# Patient Record
Sex: MALE | Race: WHITE | Employment: OTHER | URBAN - METROPOLITAN AREA
[De-identification: names, ages, dates, MRNs, and addresses within clinical notes are randomized per-mention and may not be internally consistent; named-entity substitution may affect disease eponyms.]

---

## 2017-08-22 ENCOUNTER — HOSPITAL ENCOUNTER (OUTPATIENT)
Dept: PHYSICAL THERAPY | Age: 72
Discharge: HOME | End: 2017-08-22
Payer: MEDICARE

## 2017-08-22 PROCEDURE — G8985 CARRY GOAL STATUS: HCPCS

## 2017-08-22 PROCEDURE — G8984 CARRY CURRENT STATUS: HCPCS

## 2017-08-22 PROCEDURE — 97162 PT EVAL MOD COMPLEX 30 MIN: CPT

## 2017-08-22 PROCEDURE — 97110 THERAPEUTIC EXERCISES: CPT

## 2017-08-22 NOTE — PROGRESS NOTES
Andrew Elizabeth 31  [facility] PHYSICAL THERAPY  [address] [place] [address] [address], [address] - Phone: [phone]  Fax: [phone] / 6386 [facility]  Patient Name: Roberth Ochoa :    Medical   Diagnosis: Neck pain [M54.2] Treatment Diagnosis: Neck pain [M54.2]   Onset Date: ~6-7 weeks ago     Referral Source: Sigrid Barton MD [facility]): 2017   Prior Hospitalization: See medical history Provider #: [id number]   Prior Level of Function: Independent with ADLs, ambulation; retired; doing volunteer work, which includes computer work   Comorbidities: Arthritis, hepatitis, kidney stone removal, colonoscopy   Medications: Verified on Patient Summary List   The Plan of Care and following information is based on the information from the initial evaluation.   ===========================================================================================  Assessment / key information:  Patient is a 67 y.o. male who presents with complaints of intermittent neck stiffness, neck pain, R UT pain, R forearm pain and pain/tingling 4th/5th fingers R hand. Patient demonstrates impaired posture with protracted head/shoulders and increased kyphosis, decreased C/S ROM, + Spurling's test on R and decreased position/activity tolerance. Patient able to abolish symptoms with repeated C/S retraction. FOTO (Functional Status) = 68/100, indicating slight functional limitation. Patient would benefit from skilled PT services to address these issues and improve function.   Thank you for this referral.  Active Movements:  ROM AROM PROM Comments:pain, area   Forward flexion 60    NE   Extension 20   NE   SB right 40   NE   SB left  40   NE   Rotation right 60   NE   Rotation left 50   NE      ==========================================================================================  Eval Complexity: History: MEDIUM  Complexity : 1-2 comorbidities / personal factors will impact the outcome/ POC Exam:HIGH Complexity : 4+ Standardized tests and measures addressing body structure, function, activity limitation and / or participation in recreation  Presentation: MEDIUM Complexity : Evolving with changing characteristics  Clinical Decision Making:MEDIUM Complexity : FOTO score of 26-74Overall Complexity:MEDIUM    Problem List: pain affecting function, decrease ROM, decrease strength, decrease ADL/ functional abilitiies, decrease activity tolerance and decrease flexibility/ joint mobility   Treatment Plan may include any combination of the following: Therapeutic exercise, Therapeutic activities, Neuromuscular re-education, Physical agent/modality, Manual therapy, Patient education and Self Care training  Patient / Family readiness to learn indicated by: asking questions, trying to perform skills and interest  Persons(s) to be included in education: patient (P)  Barriers to Learning/Limitations: None  Measures taken:    Patient Goal (s): \"Reduction in pain. \"   Patient self reported health status: good  Rehabilitation Potential: good   Short Term Goals: To be accomplished in  2  weeks:  1. Patient will demonstrate compliance with HEP. 2. Patient will report less than or equal to 4/10 pain at worst to allow increased activity tolerance. 3. Patient will demonstrate ability to independently abolish symptoms to allow increased activity tolerance.  Long Term Goals: To be accomplished in  4  weeks:  1. Patient will demonstrate independence with HEP. 2. Patient will demonstrate 60 degrees L C/S rotation to facilitate ADLs, including driving. 3. Patient will score greater than or equal to 74/100 on FOTO to allow increased activity tolerance.   Frequency / Duration:   Patient to be seen  1-2  times per week for 4  weeks:  Patient / Caregiver education and instruction: self care, activity modification and exercises  G-Codes (GP): Carry [id number] Current  CJ= 20-39%    Goal  CJ= 20-39%. The severity rating is based on the FOTO Score    Therapist Signature: Samara Patricio PT Date: 4/72/1595   Certification Period: 8/22/2017-11/21/2017 Time: 2:52 PM   ===========================================================================================  I certify that the above Physical Therapy Services are being furnished while the patient is under my care. I agree with the treatment plan and certify that this therapy is necessary. Physician Signature:        Date:       Time:     Please sign and return to [employer] or you may fax the signed copy to [phone]. Thank you.

## 2017-08-22 NOTE — PROGRESS NOTES
PHYSICAL THERAPY - DAILY TREATMENT NOTE    Patient Name: Magan Martinez        Date: 2017  : 1945   YES Patient  Verified  Visit #:   1     Insurance: Payor: Anders Robert / Plan: [place] MED[place] PART A & B / Product Type: Medicare /      In time: 1:35 Out time: 2:20   Total Treatment Time: 39     Medicare Time Tracking (below)   Total Timed Codes (min):  10 1:1 Treatment Time:  10     TREATMENT AREA =  C/S    SUBJECTIVE    Pain Level (on 0 to 10 scale):  0  / 10   Medication Changes/New allergies or changes in medical history, any new surgeries or procedures? NO    If yes, update Summary List   Subjective Functional Status/Changes:  []  No changes reported     Pt reports that he was helping friend unload a large, heavy television from back of [employer] truck to curb ~ 6-7 weeks ago and felt sharp pain in R UT region. Pt reports that he had pain R UE extending to 4th/5th fingers of R hand and tingling 4th/5th fingers of R hand. Pt reports that went to PCP and PA checked for RC tear. Pt reports that he was referred to orthopedic surgeon who diagnosed him with pinched nerve C6-C7. Pt reports that he had C/S x-ray, which showed arthritis. Pt reports that he continues to have intermittent pain neck, R UT and R medial forearm and intermittent tingling 4th/5th fingers of R hand. Pt reports that he has been taking Prednisone for past 5 days. Pt wearing pad R elbow secondary to pain. Pt reports that topical pain meds help with pain, but he is unable to identify anything else that relieves pain. OBJECTIVE    Physical Therapy Evaluation Cervical Spine - [facility]    SUBJECTIVE  Chief Complaint:    Mechanism of injury:    Symptoms  Pain rating (0-10):    Today: 0    Best: 0   Worst: 6   [] Constant:    [x] Intermittent: Intermittent neck, R UT and R medial forearm pain, intermittent tingling 4th/5th fingers of R hand     Aggravated by:   [] Bending [] Sitting [] Standing [] Reaching Overhead   [] Moving [] Cough [] Sneeze [] Eating   [] AM  [] PM  Lying:  [] sup   [] pro   [x] sidelying (left)  [x] Other: shaving, sitting at desk and typing on desktop computer (screen straight ahead, looking down slightly, wrist resting on mousepad), watching TV (sitting on wooden chair without armrests)   Eased by:    [] Bending [] Sitting [] Standing Lying: [] sup  [] pro  [x] sidelying (right with R elbow extended)   [x] Moving [] AM  [] PM  [x] Other: topical ibuprofen      General Health:  Red Flags Indicated? [] Yes    [] No  [] Yes [x] No Recent weight change (If yes, due to dieting? [] Yes  [] No)   [] Yes [x] No Persistent cough  [] Yes [x] No Unremitting pain at night  [] Yes [x] No Dizziness  [] Yes [x] No Blurred vision  [] Yes [x] No Hands more cold or painful in cold weather  [] Yes [x] No Ringing in ears  [] Yes [x] No Difficulty swallowing  [] Yes [x] No Dysfunction of bowel or bladder  [] Yes [x] No Recent illness within past 3 weeks (i.e, cold, flu)  [] Yes [x] No Jaw pain    Past History/Treatments: Meds    Diagnostic Tests: [] Lab work [x] X-rays    [] CT [] MRI     [] Other:  Results: + arthritis    Functional Status  Prior level of function: Retired; using computer for volunteer work, personal use; doing yardwork; exercising regularly (riding bike and walking)  Present functional limitations: Pain while using computer  What position do you sleep in?: Right sidelying with R elbow extended    Headaches: Do you have headaches? [] Yes   [x] No  How often do you get headaches? How long does the headache last?  What aggravates it? What relieves it? Does the headache coincide with any other symptoms (visual disturbances, light sensitivity)? Where is the headache? Does it change locations?   Other:    OBJECTIVE  Posture: [] WNL  Head Position: Protracted  Shoulder/Scapular Position: Protracted  C-Kyphosis:  [x] increased   [] decreased   C-Lordosis:   [] increased   [x] decreased  T-Kyphosis:  [x] increased   [] decreased  T-Lordosis:   [] increased   [] decreased     TMJ: [x] N/A [] Abnormal - ROM:   Palpation:    Cervical Retraction: [] WNL    [x] Abnormal: Decreased motion    Shoulder/Scapular Screen: [x] WNL    [] Abnormal:    Active Movements: [] N/A   [] Too acute   [] Other:  ROM % AROM % PROM Comments:pain, area   Forward flexion 60  NE   Extension 21  NE   SB right 40  NE   SB left  40  NE   Rotation right 60  NE   Rotation left 50  NE     Thoracic Spine: [] N/A    [] WNL   [x] Other: Decreased motion    PROM: NT    Palpation: - No tenderness to palpation  [] Min  [] Mod  [] Severe    Location:  [] Min  [] Mod  [] Severe    Location:  [] Min  [] Mod  [] Severe    Location:    Neuro Screen (myotome/dematome/felexes): [x] WNL  Myotome Level Muscle Test Myotome Level Muscle Test   C5 Shoulder Adduction - Deltoid C8 Finger Flexors   C6 Wrist Extension T1 Finger Abduction - Interossei   C7 Elbow Extension     Comments: Intermittent tingling lateral 2 fingers R hand    Upper Limb Tension Tests: [] N/A       Ulnar: [] R    [] L    [] +    [] -       Median: [] R    [] L    [] +    [] -       Radial: [] R    [] L    [] +    [] -    Special Tests:  Cervical:        Vertebral Artery:  [] R    [] L    [] +    [] -       Alar Ligament: [] R    [] L    [] +    [] -       Transverse Lig: [] R    [] L    [] +    [] -       Spurling's:  [x] R    [] L    [x] +    [] -       Distraction:  [] R    [] L    [] +    [] -       Compression: [] R    [] L    [] +    [] -    Thoracic Outlet Tests: [] N/A       Adson's:  [] R    [] L    [] +    [] -       Hyperabduction: [] R    [] L    [] +    [] -       Damian's:  [] R    [] L    [] +    [] -       Paty:  [] R    [] L    [] +    [] -    Diaphragmatic Breathing: [] Normal    [] Abnormal    Muscle Flexibility: [] N/A   Scalenes: [] WNL    [x] Tight    [x] R    [x] L   Upper Trap: [] WNL    [x] Tight    [x] R    [x] L   Levator: [] WNL    [x] Tight [x] R    [x] L   Pect. Minor: [] WNL    [x] Tight    [x] R    [x] L    Global Muscular Weakness: [] N/A   Lower Trap:   Rhomboids:   Middle Trap:   Serratus Ant:   Ext Rotators:    Other:    Other tests/comments:    10 min Therapeutic Exercise:  [x]  See flow sheet   Rationale:      increase ROM and decrease symptoms to improve the patients ability to perform ADLs/IADLs, functional mobility and gait safely and independently without increased pain/symptoms     During TE min Patient Education:  YES  Reviewed HEP   [x]  Progressed/Changed HEP based on:   Initiated HEP; discussed centralization/peripheralization, avoidance of activities that produce/increase/peripheralize symptoms, use of repeated C/S retraction to centralize/decrease/abolish symptoms and importance of posture/ergonomics     Other Objective/Functional Measures:    Initiated C/S retraction (seated and supine) - able to abolish symptoms with repeated C/S retraction     Post Treatment Pain Level (on 0 to 10) scale:   0  / 10     ASSESSMENT    Assessment/Changes in Function:     See plan of care  Justification for Eval Code Complexity:  Patient History : MEDIUM - arthritis  Examination HIGH - See objective  Clinical Presentation: MEDIUM  Clinical Decision Making : MEDIUM - FOTO 68/100     []  See Progress Note/Recertification   Patient will continue to benefit from skilled PT services: see plan of care   Progress toward goals / Updated goals:    See plan of care       PLAN    [x]  Upgrade activities as tolerated YES Continue plan of care   []  Discharge due to :    []  Other:      Therapist: Janelle Stephenson, PT    Date: 8/22/2017 Time: 1:38 PM

## 2017-08-23 ENCOUNTER — HOSPITAL ENCOUNTER (OUTPATIENT)
Dept: PHYSICAL THERAPY | Age: 72
Discharge: HOME | End: 2017-08-23
Payer: MEDICARE

## 2017-08-23 ENCOUNTER — APPOINTMENT (OUTPATIENT)
Dept: PHYSICAL THERAPY | Age: 72
End: 2017-08-23
Payer: MEDICARE

## 2017-08-23 PROCEDURE — 97110 THERAPEUTIC EXERCISES: CPT

## 2017-08-23 NOTE — PROGRESS NOTES
PHYSICAL THERAPY - DAILY TREATMENT NOTE    Patient Name: Tamiko Alejandre        Date: 2017  : 1945   YES Patient  Verified  Visit #:   2   of     Insurance: Payor: Venancio Matthews / Plan: [place] MEDICARE PART A & B / Product Type: Medicare /      In time: 3:00 Out time: 3:40   Total Treatment Time: 40     Medicare Time Tracking (below)   Total Timed Codes (min):  40 1:1 Treatment Time:  30     TREATMENT AREA = Neck pain [M54.2]    SUBJECTIVE    Pain Level (on 0 to 10 scale):  2C  / 10   Medication Changes/New allergies or changes in medical history, any new surgeries or procedures? NO    If yes, update Summary List   Subjective Functional Status/Changes:  []  No changes reported     \"It's doing pretty good. It's all in the neck, been doing my exercises.  \"          OBJECTIVE    30(30) min Therapeutic Exercise:  [x]  See flow sheet   Rationale:      increase ROM, increase strength and facilitate proper motor control to improve the patients ADL tolerance and return to function      Modalities Rationale: Prophylaxis/Palliative        min [] Estim, type/location:                                      []  att     []  unatt     []  w/US     []  w/ice    []  w/heat    min []  Mechanical Traction: type/lbs                   []  pro   []  sup   []  int   []  cont    []  before manual    []  after manual    min []  Ultrasound, settings/location:      min []  Iontophoresis w/ dexamethasone, location:                                               []  take home patch       []  in clinic   10 min []  Ice     []  Heat    Position/location: Supine, C/S    min []  Vasopneumatic Device, press/temp:     min []  Other:    [] Skin assessment post-treatment (if applicable):    []  intact    []  redness- no adverse reaction     []redness  adverse reaction:      Throughout Rx min Patient Education:  YES  Reviewed HEP   []  Progressed/Changed HEP based on:   Display of proper form in clinic  Improvement in condition and current complaints     Other Objective/Functional Measures:    Performed repeated retractions throughout treatment b/w sets/reps/exercises as needed as prophylaxis and symptoms management. peripheralized to R fingertips with t-band retractions, but abolished with repeated retractions       Post Treatment Pain Level (on 0 to 10) scale:   0  / 10     ASSESSMENT    Assessment/Changes in Function:     Centralization confirms mechanical derangement   []  See Progress Note/Recertification   Patient will continue to benefit from skilled PT services to  modify and progress therapeutic interventions, address functional mobility deficits, address ROM deficits, address strength deficits, analyze and address soft tissue restrictions, analyze and cue movement patterns, analyze and modify body mechanics/ergonomics and instruct in home and community integration to attain remaining goals. Progress toward goals / Updated goals:    1st session since initial eval, no significant progress noted. PLAN     []  Upgrade activities as tolerated YES Continue plan of care   []  Discharge due to :    []  Other: Update HEP to incorporate stabilization therex if he is able to maintain centralized state. Therapist: Amelie Camacho \"BJ\" Waldo Irwin, DPT, Cert. MDT, Cert. DN, Cert.  SMT    Date: 8/23/2017 Time: 3:06 PM   Future Appointments  Date Time Provider Jomar Henson   8/28/2017 1:00 PM [address]   8/30/2017 1:00 PM [facility] 1 [facility]   9/5/2017 1:00 PM [address]   9/7/2017 10:30 AM [facility] 1 [facility]   9/12/2017 9:00 AM [facility] 1 [facility]   9/19/2017 1:00 PM Haydee Mendez PT [facility]   9/21/2017 11:30 AM Haydee Mendez PT [facility]   9/26/2017 1:00 PM [facility] 1 [facility]   9/28/2017 1:00 PM Haydee Mendez PT [facility]   3/26/2018 8:30 AM [facility] RIKY Azevedo [address]

## 2017-08-28 ENCOUNTER — HOSPITAL ENCOUNTER (OUTPATIENT)
Dept: PHYSICAL THERAPY | Age: 72
Discharge: HOME | End: 2017-08-28
Payer: MEDICARE

## 2017-08-28 PROCEDURE — 97110 THERAPEUTIC EXERCISES: CPT

## 2017-08-28 PROCEDURE — 97140 MANUAL THERAPY 1/> REGIONS: CPT

## 2017-08-28 NOTE — PROGRESS NOTES
PHYSICAL THERAPY - DAILY TREATMENT NOTE    Patient Name: Mitra Epperson        Date: 2017  : 1945   YES Patient  Verified  Visit #:   3   of    Insurance: Payor: Alison Araujo / Plan: [place] MEDICARE PART A & B / Product Type: Medicare /      In time: 105 Out time: 145   Total Treatment Time: 40     Medicare Time Tracking (below)   Total Timed Codes (min):  40 1:1 Treatment Time:  40     TREATMENT AREA = neck    SUBJECTIVE    Pain Level (on 0 to 10 scale):  4 / 10   Medication Changes/New allergies or changes in medical history, any new surgeries or procedures? NO    If yes, update Summary List   Subjective Functional Status/Changes:  []  No changes reported     I was on the computer for a couple of hours yesterday and I think it flared me up in my right arm and shoulder blade. OBJECTIVE      30 min Therapeutic Exercise:  [x]  See flow sheet   Rationale:      increase ROM, increase strength, increase proprioception and improve upper thoracic and cervical posture to improve the patients ability to  perform ADL/iADLs functional mobility and gait safely and independently without increased symptoms. min Therapeutic Activity:    Rationale:      min Neuromuscular Re-ed:    Rationale:     13 min Manual Therapy: Supine manual traction with progression into cevical retraction ROM. Artist Avelina 2x10 reps. SCS to lower right lev scap tender point   Rationale:      decrease pain, increase ROM, increase tissue extensibility and decrease trigger points to improve patient's ability to maintain good posture for ADLs driving and independence with IADLS while not increasing symptoms          Rationale: While doing retractions min Patient Education:  [place] Sensor   []  Progressed/Changed HEP based on: Added standing retractions against wall.  Education and hand out for seated posture at computer     Other Objective/Functional Measures  Pt has limited AROM into cervical retraction in both sitting and supine. Pt does not tolerate wall push up or slide without increasing R UE symptoms. Post Treatment Pain Level (on 0 to 10) scale:   2 / 10     ASSESSMENT    Assessment/Changes in Function:  Pt tolerates PT for posture education and progression of cervical retraction ROM. Pt symptoms remain easily aggravated with thoracic extension and stabilization exs. Pt does not have full AROM of cervical retraction as of yet and keeps head in right lateral flexion in sitting and supine. Pt s seated posture at home computer remains aggravating to derangement symptoms         []  See Progress Note/Recertification   Patient will continue to benefit from skilled PT services to analyze,, cue,, progress,, modify,, demonstrate,, instruct, and address, movement patterns,, therapeutic interventions,, postural abnormalities,, soft tissue restrictions,, ROM,, strength,, functional mobility,, body mechanics/ergonomics, and home and community integration, to attain remaining goals. Progress toward goals / Updated goals:  1. Patient will demonstrate compliance with HEP. Radha Eves added to standing at wall  2. Patient will report less than or equal to 4/10 pain at worst to allow increased activity tolerance. ..intermittantly  3. Patient will demonstrate ability to independently abolish symptoms to allow increased activity tolerance. ..progressing         PLAN    [x]  Upgrade activities as tolerated YES Continue plan of care   []  Discharge due to :    []  Other:      Therapist: Nic Head PT    Date: 8/28/2017 Time: 12:46 PM   Future Appointments  Date Time Provider Jomar Henson   8/28/2017 1:00 PM [address]   8/30/2017 1:00 PM [address]   9/5/2017 1:00 PM [facility] PT [address]   9/7/2017 10:30 AM [facility] PT [address]   9/12/2017 9:00 AM [facility] PT [address]   9/19/2017 1:00 PM Riley Burgess PT [facility]   9/21/2017 11:30 AM Riley Burgess PT [facility] 9/26/2017 1:00 PM [facility] 1 [facility]   9/28/2017 1:00 PM Cindy G[facility]mez [facility]   3/26/2018 8:30 AM [facility] Joe De Anda

## 2017-08-30 ENCOUNTER — HOSPITAL ENCOUNTER (OUTPATIENT)
Dept: PHYSICAL THERAPY | Age: 72
Discharge: HOME | End: 2017-08-30
Payer: MEDICARE

## 2017-08-30 PROCEDURE — 97110 THERAPEUTIC EXERCISES: CPT

## 2017-08-30 NOTE — PROGRESS NOTES
PHYSICAL THERAPY - DAILY TREATMENT NOTE    Patient Name: Gloria Soto        Date: 2017  : 1945   YES Patient  Verified  Visit #:   4     Insurance: Payor: Judy Das / Plan: [place] MEDICARE PART A & B / Product Type: Medicare /      In time: 100 Out time: 150   Total Treatment Time: 50     Medicare Time Tracking (below)   Total Timed Codes (min):  40 1:1 Treatment Time:  40     TREATMENT AREA =  Neck     SUBJECTIVE    Pain Level (on 0 to 10 scale):  2  / 10   Medication Changes/New allergies or changes in medical history, any new surgeries or procedures? NO    If yes, update Summary List   Subjective Functional Status/Changes:  []  No changes reported     I felt really good last night but then this morning it is around the right shoulder blade          OBJECTIVE    Modalities Rationale: To reduce lower neck pain and muscle guarding after    min [] Estim, type/location:                                      []  att     []  unatt     []  w/US     []  w/ice    []  w/heat    min []  Mechanical Traction: type/lbs                   []  pro   []  sup   []  int   []  cont    []  before manual    []  after manual    min []  Ultrasound, settings/location:      min []  Iontophoresis w/ dexamethasone, location:                                               []  take home patch       []  in clinic   10 min [x]  Ice     []  Heat    location/position: Supine with cervical    min []  Vasopneumatic Device, press/temp:     min []  Other:    [] Skin assessment post-treatment (if applicable):    []  intact    []  redness- no adverse reaction     []redness  adverse reaction:      40 min Therapeutic Exercise:  [x]  See flow sheet   Rationale:      increase ROM and increase strength to improve the patients ability to  perform ADL/iADLs functional mobility and gait safely and independently without increased symptoms.      min Therapeutic Activity:    Rationale:      min Neuromuscular Re-ed:    Rationale: during there exs min Manual Therapy: Anterior cervical flexor deep tissue massage 3 mins while doing deep breathing exs included in there exs time. Rationale:      decrease pain, increase ROM and increase tissue extensibility to improve patient's ability to improve cervical alignment and posture for ADL s while not creating pressure into derangement and causing symptoms     min Gait Training:    Rationale:      During the exs min Patient Education:  YES  Reviewed HEP   []  Progressed/Changed HEP based on: Added to HEP     Other Objective/Functional Measures:  Pt has limited AROM into cervical retraction and thoracic extension. Kamari Stringer added pec stretching single arm in doorway. Added support wall lats stretch at wall to HEP. Kamari Stringer added supine scap stabs     Post Treatment Pain Level (on 0 to 10) scale:   0 / 10     ASSESSMENT    Assessment/Changes in Function:     Pt tolerated therapy but had to adjust angle for doorway and stretch at wall for lats. Pt s radicular symptoms remain moderately irritable . Pt is trying to improve computer ergonomics to assist with symptom control and body posture     []  See Progress Note/Recertification   Patient will continue to benefit from skilled PT services to analyze,, cue,, progress,, modify,, demonstrate,, instruct, and address, movement patterns,, therapeutic interventions,, postural abnormalities,, soft tissue restrictions,, ROM,, strength,, functional mobility, and body mechanics/ergonomics, to attain remaining goals. Progress toward goals / Updated goals:    1Patient will demonstrate compliance with HEP. ..progressing. 2. Patient will report less than or equal to 4/10 pain at worst to allow increased activity tolerance. ..progressing  3.  Patient will demonstrate ability to independently abolish symptoms to allow increased activity tolerance/ improved but needs further ROM     PLAN    []  Upgrade activities as tolerated YES Continue plan of care   []  Discharge due to :    [] Other:      Therapist: Paulino Lopez PT    Date: 8/30/2017 Time: 1:00 PM   Future Appointments  Date Time Provider Jomar Tomlini   9/5/2017 1:00 PM [address]   9/7/2017 10:30 AM [facility] PT [address]   9/12/2017 9:00 AM [facility] PT [address]   9/19/2017 1:00 PM Jodeane Apley, PT [facility]   9/21/2017 11:30 AM Jodeane Apley, PT [facility]   9/26/2017 1:00 PM [facility] PT [address]   9/28/2017 1:00 PM Jodeane Apley, PT [facility]   3/26/2018 8:30 AM [facility] RIKY Azevedo 1574

## 2017-09-05 ENCOUNTER — HOSPITAL ENCOUNTER (OUTPATIENT)
Dept: PHYSICAL THERAPY | Age: 72
Discharge: HOME | End: 2017-09-05
Payer: MEDICARE

## 2017-09-05 PROCEDURE — 97110 THERAPEUTIC EXERCISES: CPT

## 2017-09-05 PROCEDURE — 97012 MECHANICAL TRACTION THERAPY: CPT
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
## 2017-09-07 ENCOUNTER — HOSPITAL ENCOUNTER (OUTPATIENT)
Dept: PHYSICAL THERAPY | Age: 72
Discharge: HOME | End: 2017-09-07
Payer: MEDICARE

## 2017-09-07 PROCEDURE — 97110 THERAPEUTIC EXERCISES: CPT

## 2017-09-07 NOTE — PROGRESS NOTES
PHYSICAL THERAPY - DAILY TREATMENT NOTE    Patient Name: Ambrose Hanley        Date: 2017  : 1945   YES Patient  Verified  Visit #:   6   of  8  Insurance: Payor: Danielle Bradley / Plan: [place] MEDICARE PART A & B / Product Type: Medicare /      In time:  Out time:    Total Treatment Time: 62     Medicare Time Tracking (below)   Total Timed Codes (min):  48 1:1 Treatment Time:  48     TREATMENT AREA =  Cervical and right radicular    SUBJECTIVE    Pain Level (on 0 to 10 scale):  2  / 10   Medication Changes/New allergies or changes in medical history, any new surgeries or procedures? NO    If yes, update Summary List   Subjective Functional Status/Changes:  []  No changes reported     I am improving but still have the occasional sx when least expected          OBJECTIVE    Modalities Rationale:  To promote pain reduction and reduce muscle guarding to allow in improved tolerance for ADLs    min [] Estim, type/location:                                      []  att     []  unatt     []  w/US     []  w/ice    []  w/heat    min []  Mechanical Traction: type/lbs                   []  pro   []  sup   []  int   []  cont    []  before manual    []  after manual    min []  Ultrasound, settings/location:      min []  Iontophoresis w/ dexamethasone, location:                                               []  take home patch       []  in clinic   10 Min [x]  Ice     []  Heat    Location/position: Cervical and right scap in supine    min []  Vasopneumatic Device, press/temp:     min []  Other:    [] Skin assessment post-treatment (if applicable):    [x]  intact    []  redness- no adverse reaction     []redness  adverse reaction:      48 min Therapeutic Exercise:  [x]  See flow sheet   Rationale:      increase ROM, increase strength and improve coordination to improve the patients ability to  perform ADL/iADLs functional mobility and gait safely and independently without increased symptoms           During there exs min Patient Education:  YES  Reviewed HEP   []  Progressed/Changed HEP based on:   Reviewed total     Other Objective/Functional Measures: Total program cervical retractions, doorway single stretch, prone scap stab, standing lower traps lift offs, scap stabs with theraband  Added cervical rotation exs and lower trap lifts offs in standing to HEP     Post Treatment Pain Level (on 0 to 10) scale:   0 / 10     ASSESSMENT    Assessment/Changes in Function:   Pt tolerates new therapy interventions/ cueing for elbow extension with lift offs and sternal lift before cervical rotation exs     []  See Progress Note/Recertification   Patient will continue to benefit from skilled PT services to analyze,, cue,, progress,, modify,, demonstrate,, instruct, and address, movement patterns,, therapeutic interventions,, postural abnormalities,, soft tissue restrictions,, ROM,, strength,, body mechanics/ergonomics, and home and community integration, to attain remaining goals. Progress toward goals / Updated goals:  1. Patient will demonstrate compliance with HEP/ progressing  2. Patient will report less than or equal to 4/10 pain at worst to allow increased activity tolerance. ..intermittantly meeting  3. Patient will demonstrate ability to independently abolish symptoms to allow increased activity tolerance. ..progressing     PLAN    []  Upgrade activities as tolerated YES Continue plan of care   []  Discharge due to :    []  Other:      Therapist: Shante Dorsey PT    Date: 9/7/2017 Time: 10:32 AM   Future Appointments  Date Time Provider Jomar Henson   9/12/2017 9:00 AM [facility]   9/19/2017 1:00 PM Babak Ribeiro PT [facility]   9/21/2017 11:30 AM Babak Ribeiro PT [facility]   9/26/2017 1:00 PM [facility] 1 [facility]   9/28/2017 1:00 PM Babak Ribeiro PT [facility]   3/26/2018 8:30 AM Ella De Anda

## 2017-09-12 ENCOUNTER — HOSPITAL ENCOUNTER (OUTPATIENT)
Dept: PHYSICAL THERAPY | Age: 72
Discharge: HOME | End: 2017-09-12
Payer: MEDICARE

## 2017-09-12 PROCEDURE — G8985 CARRY GOAL STATUS: HCPCS

## 2017-09-12 PROCEDURE — G8986 CARRY D/C STATUS: HCPCS

## 2017-09-12 PROCEDURE — 97110 THERAPEUTIC EXERCISES: CPT

## 2017-09-12 PROCEDURE — 97530 THERAPEUTIC ACTIVITIES: CPT

## 2017-09-12 NOTE — PROGRESS NOTES
PHYSICAL THERAPY - DAILY TREATMENT NOTE    Patient Name: Jessica Clark        Date: 2017  : 1945   YES Patient  Verified  Visit #:   7  of   8  Insurance: Payor: Rodriguez Marquis / Plan: [place] MEDICARE PART A & B / Product Type: Medicare /      In time: 855 Out time: 955   Total Treatment Time: 60     Medicare Time Tracking (below)   Total Timed Codes (min):  50 1:1 Treatment Time:  50     TREATMENT AREA =  Neck and R UE referral    SUBJECTIVE    Pain Level (on 0 to 10 scale):  3 / 10   Medication Changes/New allergies or changes in medical history, any new surgeries or procedures? NO    If yes, update Summary List   Subjective Functional Status/Changes:  []  No changes reported   Flared up on Saturday and  in neck and right arm to elbow. I think I overdid it. It has settled down again and   My posture and flexible is better but I am still struggling with sitting at the  computer       OBJECTIVE        42 min Therapeutic Exercise:  [x]  See flow sheet   Rationale:      increase ROM, increase strength and improve coordination to improve the patients ability to  perform ADL/iADLs functional mobility and gait safely and independently without increased symptoms          8 min Therapeutic Activity: Postural education for use of computer and ipad with optimal head and neck alignment. Eduation in neutral head posture in conversation and avoiding  Demo 11# traction for home unit with outline on time for HEP. Performed 3 mins here with deep breathing exs   Rationale:    perform ADL/iADLs functional mobility and neutral sitting posture independently without increased symptoms. During there exs min Patient Education:  YES  Reviewed HEP   []  Progressed/Changed HEP based on:    Add home cervical traction: after 2 days add supine PA with thoracic and tennis balls plus bilat arm elevations     Other Objective/Functional Measures:    Middle traps gr 3-/5  Lower traps gr 3-/5     Post Treatment Pain Level (on 0 to 10) scale:   0 / 10     ASSESSMENT    Assessment/Changes in Function:   Cervical rotation 45 right and left 35  Over fires right upper trap with std lift offs and less lower traps . Responds to VCs  Tolerates supine PA with tennis balls at mid and upper thoracic spine     [x]  See Progress Note/Recertification   Patient will continue to benefit from skilled PT services to analyze,, cue,, progress,, modify,, demonstrate,, instruct, and address, movement patterns,, therapeutic interventions,, postural abnormalities,, soft tissue restrictions,, ROM,, strength,, functional mobility,, body mechanics/ergonomics, and home and community integration, to attain remaining goals.    Progress toward goals / Updated goals:  See PN       PLAN    []  Upgrade activities as tolerated YES Continue plan of care   []  Discharge due to :    [x]  Other: Pt to bring on side profile pics of posture at computer     Therapist: Florentin Francis PT    Date: 9/12/2017 Time: 8:49 AM   Future Appointments  Date Time Provider Jomar Henson   9/12/2017 9:00 AM [facility] 1 [facility]   9/19/2017 1:00 PM Nohemi Connell PT [facility]   9/21/2017 11:30 AM Nohemi Connell PT [facility]   9/26/2017 1:00 PM [facility] PT [address] 1 [facility]   9/28/2017 1:00 PM Nohemi Connell PT [facility]   3/26/2018 8:30 AM Ella De Anda

## 2017-09-12 NOTE — PROGRESS NOTES
Andrew [address]in[address]  [facility] PHYSICAL THERAPY  [address] [place] [address] [place], [address] - Phone: [phone]  Fax: [phone] [place] [address] [address] [facility]          Patient Name: Holly Buerger : [id number]   Treatment/Medical Diagnosis: Neck pain [M54.2]   Onset Date: 2017    Referral Source: Josefa Reno MD [facility]): 17   Prior Hospitalization: See Medical History Provider #: [id number]   Prior Level of Function: Independent with ADLs, ambulation; retired; doing volunteer work, which includes computer work   Comorbidities:  Arthritis, hepatitis, kidney stone removal, colonoscopy      Medications: Verified on Patient Summary List   Visits from [facility]: 6 Missed Visits: 0     Goal/Measure of Progress Goal Met? 1. Patient will demonstrate independence with HEP   Status at last Eval: NA Current Status: progressing no              2.  Patient will demonstrate 60 degrees L C/S rotation to facilitate ADLs, including driving. Status at last Eval: 60/50 Current Status: 45/35 no   3. Patient will score greater than or equal to 74/100 on FOTO to allow increased activity tolerance.    Status at last Eval: 68 Current Status: 74 yes     Key Functional Changes/Progress: pt has made functional improvements with FOTO score and postural awareness and endurance  Problem List: pain affecting function, decrease ROM, decrease strength, edema affecting function, decrease ADL/ functional abilitiies, decrease activity tolerance and decrease flexibility/ joint mobility   Treatment Plan may include any combination of the following: Therapeutic exercise, Therapeutic activities, Neuromuscular re-education, Physical agent/modality, Manual therapy, Aquatic therapy, Patient education, Self Care training, Functional mobility training and Home safety training  Patient Goal(s) has been updated and includes:  Reduction in pain while sitting at computer  Goals for this certification period include and are to be achieved in   3  weeks:  1. Lower traps gr. 4/5 for UE support while sitting and using computer mouse  2. Cervical Rotation right and left 55 deg for shoulder checking while driving. 3. Pt will demonstrate independence with HEP    Frequency / Duration:   Patient to be seen   2  times per week for   3    weeks:  G-Codes (GP): Carry  [id number] Goal  CJ= 20-39%   D/C  CJ= 20-39%. The severity rating is based on the FOTO Score  Assessments/Recommendations: Pt will benefit from cont skilled PT to progress impairments related to cervical and posture dysfunction to improve daily function. If you have any questions/comments please contact us directly at [phone]. Thank you for allowing us to assist in the care of your patient. Therapist Signature: Pura Mann PT Date: 2/86/8859   Certification Period:  Reporting Period: 8.22.17-11/22/17 Time: 9:36 AM   NOTE TO PHYSICIAN:  PLEASE COMPLETE THE ORDERS BELOW AND FAX TO   [facility] Physical Therapy: [phone]. If you are unable to process this request in 24 hours please contact our office: [phone].    ___ I have read the above report and request that my patient continue as recommended.   ___ I have read the above report and request that my patient continue therapy with the following changes/special instructions: ________________________________________________   ___ I have read the above report and request that my patient be discharged from therapy.      Physician Signature:        Date:       Time:

## 2017-09-19 ENCOUNTER — HOSPITAL ENCOUNTER (OUTPATIENT)
Dept: PHYSICAL THERAPY | Age: 72
Discharge: HOME | End: 2017-09-19
Payer: MEDICARE

## 2017-09-19 PROCEDURE — G8981 BODY POS CURRENT STATUS: HCPCS

## 2017-09-19 PROCEDURE — 97110 THERAPEUTIC EXERCISES: CPT

## 2017-09-19 PROCEDURE — 97140 MANUAL THERAPY 1/> REGIONS: CPT

## 2017-09-19 PROCEDURE — G8984 CARRY CURRENT STATUS: HCPCS

## 2017-09-19 PROCEDURE — G8985 CARRY GOAL STATUS: HCPCS

## 2017-09-19 NOTE — PROGRESS NOTES
PHYSICAL THERAPY - DAILY TREATMENT NOTE    Patient Name: Mia Kim        Date: 2017  : 1945   YES Patient  Verified  Visit #:     Insurance: Payor: Tung Foote / Plan: VA MEDICARE PART A & B / Product Type: Medicare /      In time: 1:00 Out time: 1:50   Total Treatment Time: 50     Medicare Time Tracking (below)   Total Timed Codes (min):  50 1:1 Treatment Time:  40     TREATMENT AREA = Neck pain [M54.2]    SUBJECTIVE    Pain Level (on 0 to 10 scale):  3  / 10   Medication Changes/New allergies or changes in medical history, any new surgeries or procedures? NO    If yes, update Summary List   Subjective Functional Status/Changes:  []  No changes reported     \"It's not going as quickly as I would have liked. The symptoms can go away but they come back . Doing the bands on the floor and tucking my chin and turning seems to do better, but it's frustrating when it comes back.  \"          OBJECTIVE    30 min Therapeutic Exercise:  [x]  See flow sheet   Rationale:      increase ROM, increase strength and facilitate proper motor control to improve the patients ADL tolerance and return to function     10 min Manual Therapy: Seated retraction mobilization   Supine repeated retraction mobilization   Rationale:      decrease pain, increase ROM, increase tissue extensibility, decrease trigger points and facilitate motor control to improve patient's ADL tolerance and return to function     Modalities Rationale: Prophylaxis/Palliative        min [] Estim, type/location:                                      []  att     []  unatt     []  w/US     []  w/ice    []  w/heat    min []  Mechanical Traction: type/lbs                   []  pro   []  sup   []  int   []  cont    []  before manual    []  after manual    min []  Ultrasound, settings/location:      min []  Iontophoresis w/ dexamethasone, location:                                               []  take home patch       []  in clinic 10 min [x]  Ice     []  Heat    Position/location: Supine with wedge under LE  C/S    min []  Vasopneumatic Device, press/temp:     min []  Other:    [] Skin assessment post-treatment (if applicable):    []  intact    []  redness- no adverse reaction     []redness  adverse reaction:      Throughout Rx min Patient Education:  YES  Reviewed HEP   []  Progressed/Changed HEP based on:   Display of proper form in clinic  Improvement in condition and current complaints     Other Objective/Functional Measures:  Assessed for progression of force with retraction, able to abolish with inclusion of extension. Performed repeated retraction with extension throughout treatment b/w sets/reps/exercises as needed as prophylaxis and symptoms management. Post Treatment Pain Level (on 0 to 10) scale:   0  / 10     ASSESSMENT    Assessment/Changes in Function:     Able to independently centralize and abolish after utlizing progression of forces with extension     []  See Progress Note/Recertification   Patient will continue to benefit from skilled PT services to  modify and progress therapeutic interventions, address functional mobility deficits, address ROM deficits, address strength deficits, analyze and address soft tissue restrictions, analyze and cue movement patterns, analyze and modify body mechanics/ergonomics and instruct in home and community integration to attain remaining goals. Progress toward goals / Updated goals:    Updated gcode goals based on lower trap strength/stability. PLAN     []  Upgrade activities as tolerated YES Continue plan of care   []  Discharge due to :    []  Other:      Therapist: Maggi Huitron \"BJ\" MARY Weiner, Cert. MDT, Cert. DN, Cert.  SMT    Date: 9/19/2017 Time: 1:05 PM   Future Appointments  Date Time Provider Jomar Henson   9/21/2017 11:30 AM Sara [facility]   9/26/2017 1:00 PM Vaughn Caldwell [facility]   9/28/2017 1:00 PM Sara Frank [address] [place] [address]   3/26/2018 8:30 [facility]   3/26/2018 8:45 AM Alisha Salter MD [address]

## 2017-09-21 ENCOUNTER — HOSPITAL ENCOUNTER (OUTPATIENT)
Dept: PHYSICAL THERAPY | Age: 72
Discharge: HOME | End: 2017-09-21
Payer: MEDICARE

## 2017-09-21 PROCEDURE — 97110 THERAPEUTIC EXERCISES: CPT

## 2017-09-21 PROCEDURE — 97140 MANUAL THERAPY 1/> REGIONS: CPT

## 2017-09-21 NOTE — PROGRESS NOTES
PHYSICAL THERAPY - DA1:05ILY TREATMENT NOTE    Patient Name: Clara Georges        Date: 2017  : 1945   YES Patient  Verified  Visit #:   9      8+  Insurance: Payor: Michael Sarmiento / Plan: [place] MEDICARE PART A & B / Product Type: Medicare /      In time: 1:05 Out time: 1:45   Total Treatment Time: 40     Medicare Time Tracking (below)   Total Timed Codes (min):  40 1:1 Treatment Time:  40     TREATMENT AREA =  Neck pain [M54.2]    SUBJECTIVE    Pain Level (on 0 to 10 scale):  1-2  / 10   Medication Changes/New allergies or changes in medical history, any new surgeries or procedures? NO     If yes, update Summary List   Subjective Functional Status/Changes:  []  No changes reported     \"My tingling is lessening but is yet to completely go away. \"          OBJECTIVE    30 min Therapeutic Exercise:  [x]  See flow sheet   Rationale:      increase ROM and increase strength to improve the patients ability to perform ADL's with improved periscapular stability. 10 min Manual Therapy: Grader 3-4 PA mob's T2-T10, TPR R upper trapezius   Rationale:      decrease pain, increase ROM and increase tissue extensibility to improve patient's ability to perform      min Patient Education:  YES  Reviewed HEP   []  Progressed/Changed HEP based on:   Patient reports compliance     Other Objective/Functional Measures:     Pt demonstrates increased stiffness of the T/S limiting C/S AROM with retractions and rotations. Repeated C/S retractions with R side-flexion centralized and abolished pt's R UE symptoms which did not return for the remainder of the session. Initiated many T/S AROM and periscapular stability exercises. See flowsheet for more details. Post Treatment Pain Level (on 0 to 10) scale:    0 / 10     ASSESSMENT    Assessment/Changes in Function:     Pt presents to PT progressing appropriately with centralization of symptoms. Will progress per activity tolerance.      []  See Progress Note/Recertification   Patient will continue to benefit from skilled PT services to modify and progress therapeutic interventions, address functional mobility deficits, address ROM deficits, address strength deficits, analyze and address soft tissue restrictions, analyze and cue movement patterns, analyze and modify body mechanics/ergonomics and assess and modify postural abnormalities to attain remaining goals. Progress toward goals / Updated goals: Addrssed LTG 1 with wall taps.      PLAN    [x]  Upgrade activities as tolerated YES Continue plan of care   []  Discharge due to :    []  Other:      Therapist: Jimmy Vitale    Date: 9/21/2017 Time: 1:20 PM     Future Appointments  Date Time Provider Jomar Henson   9/26/2017 1:00 PM Katiuska Sosa, PT [facility]   9/28/2017 1:00 PM Morro Walker, PT [facility]   3/26/2018 8:30 AM RAYNA MONK   3/26/2018 8:45 AM April Blunt MD [facility]

## 2017-09-26 ENCOUNTER — HOSPITAL ENCOUNTER (OUTPATIENT)
Dept: PHYSICAL THERAPY | Age: 72
Discharge: HOME | End: 2017-09-26
Payer: MEDICARE

## 2017-09-26 PROCEDURE — 97110 THERAPEUTIC EXERCISES: CPT

## 2017-09-26 PROCEDURE — 97140 MANUAL THERAPY 1/> REGIONS: CPT

## 2017-09-26 NOTE — PROGRESS NOTES
PHYSICAL THERAPY - DAILY TREATMENT NOTE    Patient Name: Tamiko Alejandre        Date: 2017  : 1945   YES Patient  Verified  Visit #:     Insurance: Payor: Venancio Matthews / Plan: [place] MEDICARE PART A & B / Product Type: Medicare /      In time: 100 Out time: 145   Total Treatment Time: 45     Medicare Time Tracking (below)   Total Timed Codes (min):  45 1:1 Treatment Time:  45     TREATMENT AREA =  Cervical and right UE    SUBJECTIVE    Pain Level (on 0 to 10 scale):  3 / 10   Medication Changes/New allergies or changes in medical history, any new surgeries or procedures? NO    If yes, update Summary List   Subjective Functional Status/Changes:  []  No changes reported     Still having trouble with sitting at the desk  Was very good yesterday when much more active          OBJECTIVE      37 min Therapeutic Exercise:  [x]  See flow sheet   Rationale:      increase ROM, increase strength and improve coordination to improve the patients ability to  perform ADL/iADLs functional mobility and gait safely and independently without increased symptoms     8 min Manual Therapy: PA at Trp as above in prone   Rationale:      decrease pain, increase ROM, increase tissue extensibility, decrease trigger points and increase postural awareness to improve patient's ability to  to promote pain reduction, increase soft tissue and joint mobility and reduce muscle guarding to allow for improved tolerance for functional movements and ADLs. During there exs min Patient Education:  YES  Reviewed HEP   []  Progressed/Changed HEP based on:    Added post sling stretch      Other Objective/Functional Measures:  T 1 -6 PA gr 4  5  Reps each, stretching caudal to distal in prone  Gr 3+ lower traps for lift in prone       Post Treatment Pain Level (on 0 to 10) scale:   1/ 10     ASSESSMENT    Assessment/Changes in Function:   Pt has less pain after thoracic mobility with PA manual therapy and new posterior sling mobility stretching with sit to stands     []  See Progress Note/Recertification   Patient will continue to benefit from skilled PT services to analyze,, cue,, progress,, modify,, demonstrate,, instruct, and address, movement patterns,, therapeutic interventions,, postural abnormalities,, soft tissue restrictions,, ROM,, strength,, functional mobility,, body mechanics/ergonomics, and home and community integration, to attain remaining goals. Progress toward goals / Updated goals:  1. Lower traps gr. 4/5 for UE support while sitting and using computer mouse//progressing    3.  Pt will demonstrate independence with HEP//progressing     PLAN    []  Upgrade activities as tolerated YES Continue plan of care   []  Discharge due to :    []  Other: Cont to monitor ergonomics and address full spinal alignments affect on cervical     Therapist: Pura Mann PT    Date: 9/26/2017 Time: 1:03 PM   Future Appointments  Date Time Provider Jomar Henson   9/28/2017 1:00 PM Marina Wiseman, [address] [place] [address]   10/3/2017 1:00 PM Marina Wiseman PT [facility]   10/6/2017 1:00 PM Marina Wiseman, PT [facility]   10/10/2017 1:00 PM Marina Wiseman, PT [facility]   10/13/2017 1:00 PM Marina Wiseman, [address] [place] [address]   10/17/2017 1:00 PM Raúl Mc[address], PT [facility]   10/24/2017 1:00 PM Marina Wiseman, PT [facility]   10/26/2017 1:00 PM Marina Wiseman, PT [facility]   10/31/2017 10:00 AM Dave0 Jordon [address], PT [facility]   3/26/2018 8:30 AM [facility] RIKY Lees [facility]   3/26/2018 8:45 AM Laura Chisholm MD [facility]

## 2017-09-28 ENCOUNTER — HOSPITAL ENCOUNTER (OUTPATIENT)
Dept: PHYSICAL THERAPY | Age: 72
Discharge: HOME | End: 2017-09-28
Payer: MEDICARE

## 2017-09-28 PROCEDURE — 97110 THERAPEUTIC EXERCISES: CPT

## 2017-09-28 PROCEDURE — 97140 MANUAL THERAPY 1/> REGIONS: CPT

## 2017-09-28 NOTE — PROGRESS NOTES
PHYSICAL THERAPY - DAILY TREATMENT NOTE    Patient Name: Reina Soliman        Date: 2017  : 1945   YES Patient  Verified  Visit #:     Insurance: Payor: Fantasma Many / Plan: [place] MEDICARE PART A & B / Product Type: Medicare /      In time: 12:55 Out time: 1:33   Total Treatment Time: 38     Medicare Time Tracking (below)   Total Timed Codes (min):  38 1:1 Treatment Time:  38     TREATMENT AREA = Neck pain [M54.2]    SUBJECTIVE    Pain Level (on 0 to 10 scale):  1-2  / 10   Medication Changes/New allergies or changes in medical history, any new surgeries or procedures? NO    If yes, update Summary List   Subjective Functional Status/Changes:  []  No changes reported     \"Doing pretty good actually. The open book and head tilts have been helping along with when you guys have pressed on my upper back. \"   \"I don't get numbness to the first 2 fingers anymore. Only in the pinky if I get it.         OBJECTIVE    28(28) min Therapeutic Exercise:  [x]  See flow sheet   Rationale:      increase ROM, increase strength and facilitate proper motor control to improve the patients ADL tolerance and return to function     10 min Manual Therapy: Prone PA G2-3 mobilizations to T2-T6, STM to B paraspinal MM   Rationale:      decrease pain, increase ROM, increase tissue extensibility, decrease trigger points and facilitate motor control to improve patient's ADL tolerance and return to function      Throughout Rx min Patient Education:  YES  Reviewed HEP   []  Progressed/Changed HEP based on:   Display of proper form in clinic  Improvement in condition and current complaints     Other Objective/Functional Measures:    Revised therex per flow sheet     Post Treatment Pain Level (on 0 to 10) scale:   0  / 10     ASSESSMENT    Assessment/Changes in Function:     Responded well to treatment progression without peripheralization     []  See Progress Note/Recertification   Patient will continue to benefit from skilled PT services to  modify and progress therapeutic interventions, address functional mobility deficits, address ROM deficits, address strength deficits, analyze and address soft tissue restrictions and instruct in home and community integration to attain remaining goals. Progress toward goals / Updated goals:    1st session since progress note. No significant progress observed       PLAN     []  Upgrade activities as tolerated YES Continue plan of care   []  Discharge due to :    []  Other:      Therapist: Trini Saravia \"BJ\" JEFF WeinerT, Cert. MDT, Cert. DN, Cert.  SMT    Date: 9/28/2017 Time: 1:03 PM   Future Appointments  Date Time Provider Jomar Olivasisti   10/3/2017 1:00 PM Micheline Shankar, [facility]   10/6/2017 1:00 PM Micheline Shankar, PT [facility]   10/10/2017 1:00 PM Buna Gwendolyns, PT [facility]   10/13/2017 1:00 PM Micheline Shankar, [facility]   10/17/2017 1:00 PM Hector Amaro, [facility]   10/24/2017 1:00 PM Micheline Shankar, [facility]   10/26/2017 1:00 PM Micheline Shankar, PT [facility]   10/31/2017 10:00 AM Hector Amaro, [facility]   3/26/2018 8:30 AM [facility] RIKY MONK   3/26/2018 8:45 AM Yordy Wilkinson MD [address]

## 2017-10-03 ENCOUNTER — HOSPITAL ENCOUNTER (OUTPATIENT)
Dept: PHYSICAL THERAPY | Age: 72
Discharge: HOME | End: 2017-10-03
Payer: MEDICARE

## 2017-10-03 PROCEDURE — 97110 THERAPEUTIC EXERCISES: CPT

## 2017-10-03 NOTE — PROGRESS NOTES
PHYSICAL THERAPY - DAILY TREATMENT NOTE    Patient Name: Natasha Haro        Date: 10/3/2017  : 1945   YES Patient  Verified  Visit #:     Insurance: Payor: April Becker / Plan: VA MEDICARE PART A & B / Product Type: Medicare /      In time: 12:55 Out time: 1:50   Total Treatment Time: 55     Medicare Time Tracking (below)   Total Timed Codes (min):  55 1:1 Treatment Time:  38     TREATMENT AREA = Neck pain [M54.2]    SUBJECTIVE    Pain Level (on 0 to 10 scale):  2  / 10   Medication Changes/New allergies or changes in medical history, any new surgeries or procedures? NO    If yes, update Summary List   Subjective Functional Status/Changes:  []  No changes reported     \"Yesterday I went to [facility] for a [employer]. I was stressed in traffic and my GPS led me the wrong way, so I was about a 5-6. But I did my exercises when I got out and it made it go away. I'm surprised a car ride could do that.  \"          OBJECTIVE    45(38) min Therapeutic Exercise:  [x]  See flow sheet   Rationale:      increase ROM, increase strength and facilitate proper motor control to improve the patients ADL tolerance and return to function   Modalities Rationale: Prophylaxis/Palliative        min [] Estim, type/location:                                      []  att     []  unatt     []  w/US     []  w/ice    []  w/heat    min []  Mechanical Traction: type/lbs                   []  pro   []  sup   []  int   []  cont    []  before manual    []  after manual    min []  Ultrasound, settings/location:      min []  Iontophoresis w/ dexamethasone, location:                                               []  take home patch       []  in clinic   10 min [x]  Ice     []  Heat    Position/location: Prone, T/S and C/S    min []  Vasopneumatic Device, press/temp:     min []  Other:    [] Skin assessment post-treatment (if applicable):    []  intact    []  redness- no adverse reaction     []redness  adverse reaction: Throughout Rx min Patient Education:  YES  Reviewed HEP   []  Progressed/Changed HEP based on:   Display of proper form in clinic  Improvement in condition and current complaints     Other Objective/Functional Measures:    Increased reps/sets/resistance per flow sheet. Added wall Open book to facilitate rotation     Post Treatment Pain Level (on 0 to 10) scale:   1  / 10     ASSESSMENT    Assessment/Changes in Function:     Tolerated treatment well without complaints of progression, indicating improved functional capacity       []  See Progress Note/Recertification   Patient will continue to benefit from skilled PT services to  instruct in home and community integration to attain remaining goals. Progress toward goals / Updated goals:    Progressing towards DC in 2 sessions     PLAN     []  Upgrade activities as tolerated YES Continue plan of care   []  Discharge due to :    []  Other: DC in 2 rx     Therapist: Jojo Steele \"BJ\" MARY Weiner, Cert. MDT, Cert. DN, Cert.  SMT    Date: 10/3/2017 Time: 1:10 PM   Future Appointments  Date Time Provider Jomar [place]   10/6/2017 1:00 PM Tiffani Duron [facility]   10/10/2017 1:00 PM Tiffani Duron [facility]   10/13/2017 1:00 PM Tiffani Duron [facility]   10/17/2017 1:00 PM Pierce Sena [facility]   10/24/2017 1:00 PM Tiffani Duron [facility]   10/26/2017 1:00 PM Tiffani Duron [facility]   10/31/2017 10:00 AM Pierce Sena [facility]   3/26/2018 8:30 AM RAYNA HERNÁNDEZ SCHED   3/26/2018 8:45 AM Laureano Patel MD [address]

## 2017-10-06 ENCOUNTER — HOSPITAL ENCOUNTER (OUTPATIENT)
Dept: PHYSICAL THERAPY | Age: 72
Discharge: HOME | End: 2017-10-06
Payer: MEDICARE

## 2017-10-06 PROCEDURE — 97110 THERAPEUTIC EXERCISES: CPT

## 2017-10-06 NOTE — PROGRESS NOTES
PHYSICAL THERAPY - DAILY TREATMENT NOTE    Patient Name: Liz Cisneros        Date: 10/6/2017  : 1945   YES Patient  Verified  Visit #:   15   of     Insurance: Payor: Julio Hernandez / Plan: VA MEDICARE PART A & B / Product Type: Medicare /      In time: 1:00 Out time: 1:40   Total Treatment Time: 40     Medicare Time Tracking (below)   Total Timed Codes (min):  40 1:1 Treatment Time:  40     TREATMENT AREA = Neck pain [M54.2]    SUBJECTIVE    Pain Level (on 0 to 10 scale):  2-3  / 10   Medication Changes/New allergies or changes in medical history, any new surgeries or procedures? 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 I think next week is my last session\"          OBJECTIVE    40(40) min Therapeutic Exercise:  [x]  See flow sheet   Rationale:      increase ROM, increase strength and facilitate proper motor control to improve the patients ADL tolerance and return to function   []  intact    []  redness- no adverse reaction     []redness  adverse reaction:      Throughout Rx min Patient Education:  YES  Reviewed HEP   []  Progressed/Changed HEP based on:   Display of proper form in clinic  Improvement in condition and current complaints     Other Objective/Functional Measures:    Discussed DC NV vs continuation through October. Post Treatment Pain Level (on 0 to 10) scale:   0  / 10     ASSESSMENT    Assessment/Changes in Function:     Progressing towards DC     []  See Progress Note/Recertification   Patient will continue to benefit from skilled PT services to  instruct in home and community integration to attain remaining goals. Progress toward goals / Updated goals:    [address] [place]     PLAN     []  Upgrade activities as tolerated YES Continue plan of care   []  Discharge due to :    []  Other:      Therapist: Mustapha Rogers \"BJ\" MARY Weiner, Cert. MDT, Cert. DELMI, Cert. SMT    Date: 10/6/2017 Time: 1:25 PM   Future Appointments  Date Time Provider Jomar Henson   10/10/2017 1:00 PM Mariela Frank [facility]   10/13/2017 1:00 PM Mariela Frank [facility]   10/17/2017 1:00 PM Marianna Menendez [facility]   10/24/2017 1:00 PM Mariela Frank [facility]   10/26/2017 1:00 PM Mariela Frank [facility]   10/31/2017 10:00 AM Marianna Menendez [facility]   3/26/2018 8:30 AM RAYNA MONK   3/26/2018 8:45 AM Isabell Lawson MD [facility]

## 2017-10-10 ENCOUNTER — HOSPITAL ENCOUNTER (OUTPATIENT)
Dept: PHYSICAL THERAPY | Age: 72
Discharge: HOME | End: 2017-10-10
Payer: MEDICARE

## 2017-10-10 PROCEDURE — 97110 THERAPEUTIC EXERCISES: CPT

## 2017-10-10 PROCEDURE — G8986 CARRY D/C STATUS: HCPCS

## 2017-10-10 PROCEDURE — G8985 CARRY GOAL STATUS: HCPCS

## 2017-10-10 NOTE — PROGRESS NOTES
PHYSICAL THERAPY - DAILY TREATMENT NOTE    Patient Name: Ailyn Vogt        Date: 10/10/2017  : 1945   YES Patient  Verified  Visit #:   14     Insurance: Payor: Linder Cheadle / Plan: VA MEDICARE PART A & B / Product Type: Medicare /      In time: 1:00 Out time: 1:40   Total Treatment Time: 40     Medicare Time Tracking (below)   Total Timed Codes (min):  40 1:1 Treatment Time:  38     TREATMENT AREA = Neck pain [M54.2]     SUBJECTIVE    Pain Level (on 0 to 10 scale):  1  / 10   Medication Changes/New allergies or changes in medical history, any new surgeries or procedures? NO    If yes, update Summary List   Subjective Functional Status/Changes:  []  No changes reported     \"Saw the doctor yesterday and he said my neck is no different than any other person my age. He said I can also have an elbow issue with the nerve, and he might get a nerve study on that. \"   \"I recently had to change an air conditioning unit. Someone helped me out. It's a split system. \"       OBJECTIVE    40(38) min Therapeutic Exercise:  [x]  See flow sheet   Rationale:      increase ROM, increase strength and facilitate proper motor control to improve the patients ADL tolerance and return to function       Throughout Rx min Patient Education:  YES  Reviewed HEP   []  Progressed/Changed HEP based on:   Display of proper form in clinic  Improvement in condition and current complaints     Other Objective/Functional Measures:    Educated pt on ulnar nerve dysfunction vs sleeping habits and pressure to elbow when leaning on it. Post Treatment Pain Level (on 0 to 10) scale:   0  / 10     ASSESSMENT    Assessment/Changes in Function:     See DC     []  See Progress Note/Recertification   Patient will continue to benefit from skilled PT services to  n/a to attain remaining goals.      Progress toward goals / Updated goals:    See DC     PLAN     []  Upgrade activities as tolerated NO Continue plan of care   [] Discharge due to :    []  Other:      Therapist: Ingrid Yarbrough \"BJ\" Jesenia Baez, DPT, Cert. MDT, Cert. DN, Cert.  SMT    Date: 10/10/2017 Time: 12:59 PM   Future Appointments  Date Time Provider Jomar [place]   10/10/2017 1:00 PM Thor Hew, [facility]   10/13/2017 1:00 PM Thor Hew, [facility]   10/17/2017 1:00 PM Ilsa Coello, [facility]   10/24/2017 1:00 PM Thor Hew, [facility]   10/26/2017 1:00 PM Thor Abrahamw, [facility]   10/31/2017 10:00 AM Ilsafavian Coello, [facility]   3/26/2018 8:30 AM [facility] Jun HERNÁNDEZ [facility]   3/26/2018 8:45 AM Ambar Lyons MD [address] [facility]

## 2017-10-10 NOTE — PROGRESS NOTES
Andrew Elizabeth 31  [facility] PHYSICAL THERAPY  [address] [place] [address] [place], [address] - Phone: [phone]  Fax: [phone]  DISCHARGE SUMMARY  Patient Name: Sophia [place] :    Treatment/Medical Diagnosis: Neck pain [M54.2]   Referral Source: Lazarus Rattan, MD     Date of Initial Visit: 17 Attended Visits: 14 Missed Visits: 0     SUMMARY OF TREATMENT  Patient's POC has consisted of therex, therapeutic activities, manual therapy prn, modalities prn, pt. education, and a comprehensive HEP. Treatment strategies used to address functional mobility deficits, ROM deficits, strength deficits, analyze and address soft tissue restrictions, analyze and cue movement patterns, analyze and modify body mechanics/ergonomics, assess and modify postural abnormalities and instruct in home and community integration. CURRENT STATUS  Patient currently independent in performing all HEP to address any form of symptom recurrence. His only complaint is prolonged drives, but is otherwise able to progress through ADL's without recurrence of symptoms. Cervical AROM is WFL in all planes without recurrence of paraesthesia, indicating reduction in derangement. GOALS/MEASURE OF PROGRESS Goal Met? ·   1. Lower traps gr. 4/5 for UE support while sitting and using computer mouse  2. Cervical Rotation right and left 55 deg for shoulder checking while driving. 3. Pt will demonstrate independence with HEP   MET    MET    MET         G-Codes: Carry   Goal  CJ= 20-39%   D/C  CI= 1-19%. The severity rating is based on the FOTO Score    RECOMMENDATIONS  Discontinue therapy. Progressing towards or have reached established goals. If you have any questions/comments please contact us directly at [phone]. Thank you for allowing us to assist in the care of your patient. Therapist Signature: Ingrid Yarbrough \"GERARDO\" Jesenia Baez, JEFFT, Cert. MDT, Cert. DN, Cert.  SMT Date: 10/10/17   Reporting Period: 0/25/65-69/47/68     Certification Period 8/22/17-12/21/17 Time: 1:26 PM     NOTE TO PHYSICIAN:  PLEASE COMPLETE THE ORDERS BELOW AND FAX TO   [facility]: [phone]  If you are unable to process this request in 24 hours please contact our office: [phone]    ___ I have read the above report and request that my patient continue as recommended.   ___ I have read the above report and request that my patient continue therapy with the following changes/special instructions:_________________________________________________________   ___ I have read the above report and request that my patient be discharged from therapy.      Physician Signature:        Date:     Time:

## 2017-10-13 ENCOUNTER — APPOINTMENT (OUTPATIENT)
Dept: PHYSICAL THERAPY | Age: 72
End: 2017-10-13
Payer: MEDICARE

## 2017-10-17 ENCOUNTER — APPOINTMENT (OUTPATIENT)
Dept: PHYSICAL THERAPY | Age: 72
End: 2017-10-17
Payer: MEDICARE

## 2017-10-24 ENCOUNTER — APPOINTMENT (OUTPATIENT)
Dept: PHYSICAL THERAPY | Age: 72
End: 2017-10-24
Payer: MEDICARE

## 2017-10-26 ENCOUNTER — APPOINTMENT (OUTPATIENT)
Dept: PHYSICAL THERAPY | Age: 72
End: 2017-10-26
Payer: MEDICARE

## 2017-10-31 ENCOUNTER — APPOINTMENT (OUTPATIENT)
Dept: PHYSICAL THERAPY | Age: 72
End: 2017-10-31
Payer: MEDICARE

## 2019-07-03 ENCOUNTER — APPOINTMENT (OUTPATIENT)
Dept: GENERAL RADIOLOGY | Age: 74
End: 2019-07-03
Attending: PHYSICIAN ASSISTANT
Payer: MEDICARE

## 2019-07-03 ENCOUNTER — HOSPITAL ENCOUNTER (EMERGENCY)
Age: 74
Discharge: HOME | End: 2019-07-03
Attending: EMERGENCY MEDICINE
Payer: MEDICARE

## 2019-07-03 ENCOUNTER — APPOINTMENT (OUTPATIENT)
Dept: VASCULAR SURGERY | Age: 74
End: 2019-07-03
Attending: PHYSICIAN ASSISTANT
Payer: MEDICARE

## 2019-07-03 VITALS
WEIGHT: 210 LBS | HEIGHT: 73 IN | DIASTOLIC BLOOD PRESSURE: 76 MMHG | TEMPERATURE: 97.8 F | OXYGEN SATURATION: 97 % | RESPIRATION RATE: 18 BRPM | HEART RATE: 61 BPM | SYSTOLIC BLOOD PRESSURE: 116 MMHG | BODY MASS INDEX: 27.83 KG/M2

## 2019-07-03 DIAGNOSIS — M79.89 RIGHT LEG SWELLING: Primary | ICD-10-CM

## 2019-07-03 DIAGNOSIS — M25.561 POSTERIOR RIGHT KNEE PAIN: ICD-10-CM

## 2019-07-03 LAB
ANION GAP SERPL CALC-SCNC: 6 MMOL/L (ref 3–18)
BASOPHILS # BLD: 0 K/UL (ref 0–0.1)
BASOPHILS NFR BLD: 1 % (ref 0–2)
BUN SERPL-MCNC: 21 MG/DL (ref 7–18)
BUN/CREAT SERPL: 21 (ref 12–20)
CALCIUM SERPL-MCNC: 8.5 MG/DL (ref 8.5–10.1)
CHLORIDE SERPL-SCNC: 108 MMOL/L (ref 100–108)
CO2 SERPL-SCNC: 27 MMOL/L (ref 21–32)
CREAT SERPL-MCNC: 0.98 MG/DL (ref 0.6–1.3)
DIFFERENTIAL METHOD BLD: ABNORMAL
EOSINOPHIL # BLD: 0.3 K/UL (ref 0–0.4)
EOSINOPHIL NFR BLD: 5 % (ref 0–5)
ERYTHROCYTE [DISTWIDTH] IN BLOOD BY AUTOMATED COUNT: 13.8 % (ref 11.6–14.5)
GLUCOSE SERPL-MCNC: 103 MG/DL (ref 74–99)
HCT VFR BLD AUTO: 42.2 % (ref 36–48)
HGB BLD-MCNC: 14 G/DL (ref 13–16)
INR PPP: 0.9 (ref 0.8–1.2)
LYMPHOCYTES # BLD: 0.9 K/UL (ref 0.9–3.6)
LYMPHOCYTES NFR BLD: 20 % (ref 21–52)
MCH RBC QN AUTO: 31.6 PG (ref 24–34)
MCHC RBC AUTO-ENTMCNC: 33.2 G/DL (ref 31–37)
MCV RBC AUTO: 95.3 FL (ref 74–97)
MONOCYTES # BLD: 0.3 K/UL (ref 0.05–1.2)
MONOCYTES NFR BLD: 7 % (ref 3–10)
NEUTS SEG # BLD: 3.1 K/UL (ref 1.8–8)
NEUTS SEG NFR BLD: 67 % (ref 40–73)
PLATELET # BLD AUTO: 196 K/UL (ref 135–420)
PMV BLD AUTO: 11.5 FL (ref 9.2–11.8)
POTASSIUM SERPL-SCNC: 4.2 MMOL/L (ref 3.5–5.5)
PROTHROMBIN TIME: 12.2 SEC (ref 11.5–15.2)
RBC # BLD AUTO: 4.43 M/UL (ref 4.7–5.5)
SODIUM SERPL-SCNC: 141 MMOL/L (ref 136–145)
WBC # BLD AUTO: 4.6 K/UL (ref 4.6–13.2)

## 2019-07-03 PROCEDURE — 93971 EXTREMITY STUDY: CPT

## 2019-07-03 PROCEDURE — 99283 EMERGENCY DEPT VISIT LOW MDM: CPT

## 2019-07-03 PROCEDURE — 85025 COMPLETE CBC W/AUTO DIFF WBC: CPT

## 2019-07-03 PROCEDURE — 80048 BASIC METABOLIC PNL TOTAL CA: CPT

## 2019-07-03 PROCEDURE — 85610 PROTHROMBIN TIME: CPT

## 2019-07-03 NOTE — DISCHARGE INSTRUCTIONS

## 2019-07-03 NOTE — ED NOTES
Patient stated understanding of discharge instructions. Patient was ambulatory upon discharge. Patient received no prescriptions.   Patient armband removed and shredded

## 2019-07-03 NOTE — ED PROVIDER NOTES
EMERGENCY DEPARTMENT HISTORY AND PHYSICAL EXAM    4:11 PM      Date: 7/3/2019  Patient Name: Lyndon Cobb    History of Presenting Illness     Chief Complaint   Patient presents with    Leg Pain         History Provided By: Patient    Chief Complaint: leg pain       Additional History (Context): Lyndon Cobb is a 76 y.o. male with history of BPH, hepatitis  who presents with right leg pain. Pain is located behind the right knee and started 5 days ago. 1 week ago he returned from [place]. He admits to swelling down to the ankle. Pain is rated 4 out of 10. He does not have a history of DVT and is not on blood thinners. He is not a smoker. He denies any chest pain or shortness of breath. No fevers. No trauma to the knee. PCP: Jamarcus Easley MD    Current Outpatient Medications   Medication Sig Dispense Refill    tamsulosin (FLOMAX) 0.4 mg capsule Take 1 Cap by mouth daily (after dinner). 30 Cap 12    gabapentin (NEURONTIN) 300 mg capsule Take 300 mg by mouth three (3) times daily.  multivitamin (ONE A DAY) tablet Take 1 Tab by mouth daily.  VIAGRA 50 mg tablet       Cholecalciferol, Vitamin D3, (VITAMIN D3) 1,000 unit cap Take  by mouth.          Past History     Past Medical History:  Past Medical History:   Diagnosis Date    BPH (benign prostatic hyperplasia)     BPH with obstruction/lower urinary tract symptoms     Hepatitis     History of kidney stones     Hypertrophy of prostate with urinary obstruction and other lower urinary tract symptoms (LUTS)     Kidney stone     Liver disease     hep c in 1967    Microscopic hematuria     Nocturia     Personal history of colonic polyps     Urinary frequency     Urine leukocytes        Past Surgical History:  Past Surgical History:   Procedure Laterality Date    HX COLONOSCOPY  04/14/2016    HX ENDOSCOPY  2005    COLON, DIAGNOSTIC    HX UROLOGICAL  01/11/2017    [facility] - Cystoscopy and Urolift (transprostatic implants #4) - Dr Jaja Shretsha         Family History:  Family History   Problem Relation Age of Onset    Other Mother         hepatitis       Social History:  Social History     Tobacco Use    Smoking status: Never Smoker    Smokeless tobacco: Never Used   Substance Use Topics    Alcohol use: Yes     Alcohol/week: 2.4 oz     Types: 4 Glasses of wine per week     Comment: occasioanally    Drug use: No       Allergies:  No Known Allergies      Review of Systems       Review of Systems   Constitutional: Negative for fever. HENT: Negative for facial swelling. Eyes: Negative for visual disturbance. Respiratory: Negative for shortness of breath. Cardiovascular: Positive for leg swelling. Negative for chest pain. Gastrointestinal: Negative for abdominal pain. Genitourinary: Negative for dysuria. Musculoskeletal: Positive for arthralgias. Negative for neck pain. Skin: Negative for rash. Neurological: Negative for dizziness. Psychiatric/Behavioral: Negative for confusion. All other systems reviewed and are negative. Physical Exam     Visit Vitals  /77   Pulse 73   Temp 97.8 °F (36.6 °C)   Resp 18   Ht 6' 1\" (1.854 m)   Wt 95.3 kg (210 lb)   SpO2 97%   BMI 27.71 kg/m²         Physical Exam   Constitutional: He appears well-developed and well-nourished. No distress. HENT:   Head: Normocephalic and atraumatic. Eyes: Conjunctivae are normal.   Neck: Normal range of motion. Neck supple. Cardiovascular: Normal rate. Pulmonary/Chest: Effort normal.   Abdominal: Soft. Musculoskeletal: Normal range of motion. Tenderness and swelling to posterior right knee. No warmth or erythema. 1+ pitting edema throughout the right lower leg from knee to ankle. No tenderness throughout the joint. Full ROM. PT and DP pulses palpable/     Neurological: He is alert. Skin: Skin is warm and dry. He is not diaphoretic. Psychiatric: He has a normal mood and affect.    Nursing note and vitals reviewed. Diagnostic Study Results     Labs -  Recent Results (from the past 12 hour(s))   CBC WITH AUTOMATED DIFF    Collection Time: 07/03/19  4:24 PM   Result Value Ref Range    WBC 4.6 4.6 - 13.2 K/uL    RBC 4.43 (L) 4.70 - 5.50 M/uL    HGB 14.0 13.0 - 16.0 g/dL    HCT 42.2 36.0 - 48.0 %    MCV 95.3 74.0 - 97.0 FL    MCH 31.6 24.0 - 34.0 PG    MCHC 33.2 31.0 - 37.0 g/dL    RDW 13.8 11.6 - 14.5 %    PLATELET 041 138 - 448 K/uL    MPV 11.5 9.2 - 11.8 FL    NEUTROPHILS 67 40 - 73 %    LYMPHOCYTES 20 (L) 21 - 52 %    MONOCYTES 7 3 - 10 %    EOSINOPHILS 5 0 - 5 %    BASOPHILS 1 0 - 2 %    ABS. NEUTROPHILS 3.1 1.8 - 8.0 K/UL    ABS. LYMPHOCYTES 0.9 0.9 - 3.6 K/UL    ABS. MONOCYTES 0.3 0.05 - 1.2 K/UL    ABS. EOSINOPHILS 0.3 0.0 - 0.4 K/UL    ABS. BASOPHILS 0.0 0.0 - 0.1 K/UL    DF AUTOMATED     METABOLIC PANEL, BASIC    Collection Time: 07/03/19  4:24 PM   Result Value Ref Range    Sodium 141 136 - 145 mmol/L    Potassium 4.2 3.5 - 5.5 mmol/L    Chloride 108 100 - 108 mmol/L    CO2 27 21 - 32 mmol/L    Anion gap 6 3.0 - 18 mmol/L    Glucose 103 (H) 74 - 99 mg/dL    BUN 21 (H) 7.0 - 18 MG/DL    Creatinine 0.98 0.6 - 1.3 MG/DL    BUN/Creatinine ratio 21 (H) 12 - 20      GFR est AA >60 >60 ml/min/1.73m2    GFR est non-AA >60 >60 ml/min/1.73m2    Calcium 8.5 8.5 - 10.1 MG/DL   PROTHROMBIN TIME + INR    Collection Time: 07/03/19  4:24 PM   Result Value Ref Range    Prothrombin time 12.2 11.5 - 15.2 sec    INR 0.9 0.8 - 1.2         Radiologic Studies -   No orders to display         Medical Decision Making   I am the first provider for this patient. I reviewed the vital signs, available nursing notes, past medical history, past surgical history, family history and social history. Vital Signs-Reviewed the patient's vital signs.       Records Reviewed: Nursing Notes (Time of Review: 4:11 PM)    ED Course: Progress Notes, Reevaluation, and Consults:      Provider Notes (Medical Decision Making): MDM  Number of Diagnoses or Management Options  Posterior right knee pain:   Right leg swelling:   Diagnosis management comments: 66yo M with right leg pain and swelling behind knee. Recent travel to [facility]. No CP or SOB. Vitals stable. Do not suspect PE. Duplex pending. 6:55 PM  Duplex negative. Labs normal. I do not feel xray is necessary, he does not have any bone tenderness and no injury. Possible PCL sprain based on location? Referred to ortho. Discussed treatment plan, return precautions, symptomatic relief, and expected time to improvement. All questions answered. Patient is stable for discharge and outpatient management. Diagnosis     Clinical Impression:   1. Right leg swelling    2. Posterior right knee pain        Disposition: Discharged      Follow-up Information     Follow up With Specialties Details Why Contact Info    Summer Parada MD Family Practice Schedule an appointment as soon as possible for a visit  [facility] [address]  [address]      Elizabeth Duncan MD Orthopedic Surgery Schedule an appointment as soon as possible for a visit  [address]  [address] [address] [phone]      [facility] EMERGENCY DEPT Emergency Medicine  Immediately if symptoms worsen [address] [facility]  [phone]           Patient's Medications   Start Taking    No medications on file   Continue Taking    CHOLECALCIFEROL, VITAMIN D3, (VITAMIN D3) 1,000 UNIT CAP    Take  by mouth. GABAPENTIN (NEURONTIN) 300 MG CAPSULE    Take 300 mg by mouth three (3) times daily. MULTIVITAMIN (ONE A DAY) TABLET    Take 1 Tab by mouth daily. TAMSULOSIN (FLOMAX) 0.4 MG CAPSULE    Take 1 Cap by mouth daily (after dinner).     VIAGRA 50 MG TABLET       These Medications have changed    No medications on file   Stop Taking    No medications on file     _______________________________    Attestations:  Jalil Moran RASHID Benson acting as a scribe for and in the presence of RASHID Caraballo      July 03, 2019 at 6:57 PM       Provider Attestation:      I personally performed the services described in the documentation, reviewed the documentation, as recorded by the scribe in my presence, and it accurately and completely records my words and actions.  July 03, 2019 at 6:57 PM - RASHID Caraballo  _______________________________

## 2020-09-03 ENCOUNTER — HOSPITAL ENCOUNTER (OUTPATIENT)
Dept: PHYSICAL THERAPY | Age: 75
Discharge: HOME | End: 2020-09-03
Payer: MEDICARE

## 2020-09-03 PROCEDURE — 97112 NEUROMUSCULAR REEDUCATION: CPT

## 2020-09-03 PROCEDURE — 97140 MANUAL THERAPY 1/> REGIONS: CPT

## 2020-09-03 PROCEDURE — 97161 PT EVAL LOW COMPLEX 20 MIN: CPT

## 2020-09-03 PROCEDURE — 97110 THERAPEUTIC EXERCISES: CPT

## 2020-09-03 NOTE — PROGRESS NOTES
[address]  [facility] PHYSICAL THERAPY  [address] [facility] [place], [address] - Phone: [phone]  Fax: [phone] / [address] [facility]  Patient Name: aCrroll Whitehead : [id number]   Medical   Diagnosis: Pain in left knee [M25.562] Treatment Diagnosis: Left Knee   Onset Date: ~8 months     Referral Source: Anisha Chaudhary MD Start of Care [facility]): 9/3/2020   Prior Hospitalization: See medical history Provider #: [id number]   Prior Level of Function: Recreationally active   Comorbidities: Recent hx of right knee baker's cyst, hx kidney stones   Medications: Verified on Patient Summary List   The Plan of Care and following information is based on the information from the initial evaluation.   ===========================================================================================  Assessment / key information: Patient is a 76 y.o. male presenting to clinic with antalgic gait due to left knee pain. He reports onset some time after having Baker's cyst to right knee after flight in January. Patient continues to have Baker's cyst in right knee, but has no pain or limitations in ROM. However, he noted left knee pain when he established a compensatory gait pattern ~8 months ago. He is now unable to straighten or bend his knee without pain, and has a constant ache that is worsened with loading, transfers, walking, and negotiation of steps. Left knee AROM = -9-95 degrees, PROM = -6-99 degrees. He reports receiving injections to his knee but feels the pain has worsened since. Patient also reports intermittent left buttock pain that radiates through his hamstrings since his knee pain has worsened. MDT screening reveals L/S component as he is able to centralize and abolish left thigh/buttock pain with repeated lumbar movements.  Pt  will benefit from physical therapist management to address his impairments (listed below),  educate him, and improve his level of function. Thanks for your referral.   ===========================================================================================  Eval Complexity: History: MEDIUM  Complexity : 1-2 comorbidities / personal factors will impact the outcome/ POC Exam:HIGH Complexity : 4+ Standardized tests and measures addressing body structure, function, activity limitation and / or participation in recreation  Presentation: LOW Complexity : Stable, uncomplicated  Clinical Decision Making:MEDIUM Complexity : FOTO score of 26-74Overall Complexity:LOW   Problem List: pain affecting function, decrease ROM, decrease strength, edema affecting function, impaired gait/ balance, decrease ADL/ functional abilitiies, decrease activity tolerance, decrease flexibility/ joint mobility and decrease transfer abilities  FOTO score: 48 indicating 52% functional disability  Treatment Plan may include any combination of the following: Therapeutic exercise, Therapeutic activities, Neuromuscular re-education, Physical agent/modality, Gait/balance training, Manual therapy, Patient education, Self Care training, Functional mobility training, Home safety training and Stair training  Patient / Family readiness to learn indicated by: asking questions, trying to perform skills and interest  Persons(s) to be included in education: patient (P)  Barriers to Learning/Limitations: None  Measures taken: n/a   Patient Goal (s): Walk normally   Patient self reported health status: good  Rehabilitation Potential: excellent   Short Term Goals: To be accomplished in  3  weeks:  1. Patient to be adherent to HEP to facilitate pain control with ADL's.  2. Patient to demonstrate left knee PROM extension to 0 degrees to facilitate a more normalized gait. 3. Patient to demonstrate a normalized gait pattern.   4. Patient to Centralize left LE sx to level of L/S to demonstrate effectiveness of directional preference exercises and decrease risk of LE dysfunction   Long Term Goals: To be accomplished in  6  weeks:  1. Patient to be Safe and Independent with HEP to self-manage/prevent symptoms after DC. 2. Patient to demonstrate safe stair negotiation in reciprocal pattern to facilitate community mobility and ease in accessing the second floor of his home. 3. Patient to increase FOTO score to > 63 to indicate improved functional independence. 4. Patient to demonstrate left knee AROM flexion to > 110 degrees to facilitate ease in donning/doffing LE clothing. Frequency / Duration:   Patient to be seen  2  times per week for 6  weeks:  Patient / Caregiver education and instruction: activity modification and exercises. We reviewed our facility's Patient Personal Responsibilities (PPR) form, particularly in regards to compliance towards his appointment time, our attendance policy, and his home exercise program. Patient was informed of possible discharge for non-compliance to our attendance policy per PPR form. We also discussed his POC as deemed appropriate by the treating therapist and physician. Patient verbalized understanding that he must show objective and functional improvement in an appropriate time frame. Patient verbalized understanding that should progress or compliance be lacking, we will contact the referring physician for further consultation to address and attempt to establish alternate treatment strategies as necessary and/or possibly discharge. Therapist Signature: Claire Gutierres \"BJ\" [address] [place] [address] Drive, DPT, Cert. MDT, Cert. DN, Cert. SMT, Dip. Osteopractic Date: 7/8/0755   Certification Period: 9/3/2020-12/2/2020 Time: 9:29 AM   ===========================================================================================  I certify that the above Physical Therapy Services are being furnished while the patient is under my care. I agree with the treatment plan and certify that this therapy is necessary.     Physician Signature:        Date:       Time:     Please sign and return to [employer] or you may fax the signed copy to [phone]. Thank you.

## 2020-09-03 NOTE — PROGRESS NOTES
PHYSICAL THERAPY - DAILY TREATMENT NOTE  Patient Name: Nena Gandhi        Date: 9/3/2020  : 1945   [x]  Patient  Verified  Visit #:     Insurance: Payor: Trevor Webber / Plan: [place] & [place] / Product Type: Medicare /      In time:   8:36          Out time:   9:30 Total Treatment Time (min):   54   Medicare Time Tracking (below)   Total Timed Codes (min):  38 1:1 Treatment Time:  38     TREATMENT AREA = Pain in left knee [M25.562]    SUBJECTIVE    Pain Level (on 0 to 10 scale):  3  / 10   Medication Changes/New allergies or changes in medical history, any new surgeries or procedures? []  No    []  Yes   If yes, update Summary List:    Subjective Functional Status/Changes:  []  No changes reported     HISTORY    Present Symptoms: Left lateral aspect of left knee    Present since: chronic  [] Improving []  Unchanging []  Worsening          Commenced as a result of: exacerbated after having a long flight in January and had a baker's cyst in right knee. Had compensated and now has left knee pain. Reports had received injection ~3 weeks ago, mild improvement first week but now feels worse since. or []  No apparent reason   or Surgery    Symptoms at onset:    Constant symptoms: Ache, soreness    Intermittent symptoms: sharp pain    What produces or worsens: stairs, steps, especially going down. Donning/doffing shoes and socks. Rising and sitting     What stops or reduces: riding bike     Continued use makes the pain:  [] Better [x]  Worse []  No effect     Disturbed night: [x] No    [] Yes    Pain at rest: [] No    [x] Yes       Treatments this episode: injection to left knee    Previous episodes: none prior to January.      Previous treatment: none    Other: Imaging (+) for OA  Radiating left buttock to thigh pain     General Health:  [x] Good []  Fair []  Poor     Imaging:   [x] Yes []  No       Work:  Mechanical Stresses: retired [employer]     Leisure: Mechanical Stresses: riding bike, gardening. Functional disability from present episode: has stairs at home with rails.      Summary:    [] Acute []  Sub-acute []  Chronic     [] Trauma/Surgery []  Insidious onset        OBJECTIVE    Gait:  [] Normal    [x] Abnormal    [x] Antalgic    [] NWB    Device:    Describe: reduced swing and stance on left    Myotome Level Muscles Nerve Reflex Sensation Action   L1-L3 Iliopsoas T12/L1-3  Quadriceps L2-4  Adductors L2-L4 (Iliacus)- Femoral  Femoral  Obturator/Sciatic N/A  L3-4 = Patella L1- Inguinal Crease  L2- Anterior Thigh  L3- Anterior Thigh above knee Hip Flexion  Knee Extension   L4 Tibialis anterior L4&5   Deep Peroneal Patella Anterior Knee Suprapatellar Ankle Dorsiflexion   L5 Extensor Hallucis Longus  Extensor Digitorum Lungus  Gluteus Medius Deep Peroneal         Superior Gluteal None Reliable Dorsum of Foot/Great Toe  Anterior Shank Extensor  to Great Toe        Hip Internal Rotation   S1 Peroneus Longus  Gastrocnemius & Soleus  Gluteus Kyrie Superficial Peroneal  Tibial    Inferior Gluteal Achilles Tendon Lateral Shank around Lateral Malleolus  Lateral Aspect/Dorsum of GT   Plantar Flexion      Hip Extension   Notable findings from above: unremarkable    ROM / Strength  [] Unable to assess                  AROM                      PROM                   Strength (1-5)    Left Right Left Right Left Right   Hip Flexion     4 4    Extension     4 4    Abduction     4 4    Adduction     4 4         AROM          PROM                      Strength (1-5)    Left Right Left Right Left Right   Knee Flexion 95 118 99 125 4 4    Extension 9 2 5 0 4 4     Flexibility: [] Unable to assess at this time  Hamstrings:    (L) Tightness= [] WNL   [] Min   [] Mod   [x] Severe    (R) Tightness= [] WNL   [] Min   [] Mod   [x] Severe  Quadriceps:    (L) Tightness= [] WNL   [] Min   [] Mod   [x] Severe    (R) Tightness= [] WNL   [] Min   [x] Mod   [] Severe  Gastroc:      (L) Tightness= [] WNL   [] Min   [x] Mod   [] Severe    (R) Tightness= [] WNL   [] Min   [x] Mod   [] Severe  Other:    Palpation:   Neg/Pos  Neg/Pos  Neg/Pos   Joint Line + Quad tendon - Patellar ligament -   Patella - Fibular head - Pes Anserinus -   Tibial tubercle - Hamstring tendons - Infrapatellar fat pad -     Optional Tests:  Patellar Positioning (Static)   [x]L [x]R Normal []L []R Lateral   []L []R Garnette Monday      []L []R Medial   []L []R Baja    Patellar Tracking   []L []R Glide (Lat)   []L []R Tilt (Lat)     []L []R Glide (Med)  []L []R Tilt (Med)      []L []R Tile (Inf)     Patellar Mobility   []L []R Hypermobile []L []R Hypomobile         Lachmans  [x] Neg    [] Pos Posterior Drawer [] Neg    [] Pos  Pivot Shift  [x] Neg    [] Pos Posterior Sag  [] Neg    [] Pos  FADY   [x] Neg    [] Pos Shane's Test [] Neg    [] Pos  ALRI   [x] Neg    [] Pos Squat   [] Neg    [] Pos  Valgus@ 0 Degrees [x] Neg    [] Pos Conor-Matthew [] Neg    [] Pos  Valgus@ 30 Degrees [x] Neg    [] Pos Patellar Apprehension[] Neg    [] Pos  Varus@ 0 Degrees [x] Neg    [] Pos Fraga's Compression [] Neg    [] Pos  Varus@ 30 Degrees [x] Neg    [] Pos Ely's Test  [] Neg    [] Pos  Apley's Compression [] Neg    [] Pos Star's Test  [] Neg    [] Pos  Apley's Distraction [] Neg    [] Pos Stroke Test  [] Neg    [] Pos   Anterior Drawer [] Neg    [] Pos Fluctuation Test [] Neg    [] Pos  Thessaly:                  [] Neg    [] Pos                 Spine: L/S    Movement loss: Extension    Effect of repeated movements: decrease left thigh pain    Effect of static positioning: abolish left buttock and thigh pain    Spine testing:  [] Not relevant [x]  Relevant [x]  Secondary problem   Therapeutic Procedures:  Min Procedure Specifics + Rationale   n/a [x]  Patient Education (performed throughout session) [x] Review HEP    [] Progressed/Changed HEP based on:   [] proper performance and advancement of Therex/TA   [] reduction in pain level    [] increased functional capacity [] change in directional preference   15 [x] Therapeutic Exercise   [x]  See Flowsheet   Rationale: increase ROM and increase strength to improve the patients ability to participate in ADL's    8 [x]  Manual Therapy [] STM/DTM     [] IASTM     [] Scar Massage  [] Cross Friction       [] PNF         [] PROM    [] TDN (see objective; actual needle insertion time not billed)   [] Joint mobilization: Gr I [] II []  III [] IV[] V[]:  Involved Knee Patellar G2-G4 mobs inferior/medial, STM to quads/HS/ITB interface, Ant/Post G3-G4 Tibiofemoral glides, PROM flexion/Ext contract/relax  Rationale: decrease pain, increase ROM, increase tissue extensibility, decrease trigger points and increase postural awareness to attain functional use and participation with ADL's   15 [x] Neuromuscular Re-ed   [x]  See Flowsheet;  Repeated lumbar morements  Rationale: increase ROM, increase strength, improve coordination, improve balance and increase proprioception  to improve the patients ability to safely participate in ADL's         Post Treatment Pain Level (on 0 to 10) scale:   1  / 10     ASSESSMENT    Assessment/Changes in Function:   Justification for Eval Code Complexity:  Patient History : mod  Examination high  Clinical Presentation: low  Clinical Decision Making : FOTO mod   []  See Progress Note/Recertification   Patient will continue to benefit from skilled PT services to  modify and progress therapeutic interventions, address functional mobility deficits, address ROM deficits, address strength deficits, analyze and address soft tissue restrictions, analyze and cue movement patterns, analyze and modify body mechanics/ergonomics, assess and modify postural abnormalities and instruct in home and community integration to attain remaining goals       [x]  Upgrade activities as tolerated []  Continue plan of care   []  Discharge due to :    [x]  Other: Initiate POC     Therapist: Vsena Main \"BJ\" MARY Weiner, Cert. MDT, Cert.  DN, Cert. SMT, Dip.  Osteopractic Date: 9/3/2020     Time: 8:47 AM

## 2020-09-08 ENCOUNTER — HOSPITAL ENCOUNTER (OUTPATIENT)
Dept: PHYSICAL THERAPY | Age: 75
Discharge: HOME | End: 2020-09-08
Payer: MEDICARE

## 2020-09-08 PROCEDURE — 97110 THERAPEUTIC EXERCISES: CPT

## 2020-09-08 PROCEDURE — 97530 THERAPEUTIC ACTIVITIES: CPT

## 2020-09-08 NOTE — PROGRESS NOTES
PHYSICAL THERAPY - DAILY TREATMENT NOTE    Patient Name: Brayan Ramachandran        Date: 2020  : 1945   yes Patient  Verified  Visit #:      of     Insurance: Payor: Aida Miller / Plan: [place] MED[place] & B / Product Type: Medicare /      In time: 930 Out time: 1024   Total Treatment Time: 54     Medicare/BCBS Time Tracking (below)   Total Timed Codes (min):  54 1:1 Treatment Time:  54     TREATMENT AREA =  Pain in left knee [M25.562]    SUBJECTIVE  Pain Level (on 0 to 10 scale):  2  / 10   Medication Changes/New allergies or changes in medical history, any new surgeries or procedures?    no  If yes, update Summary List   Subjective Functional Status/Changes:  []  No changes reported     \"I am feeling it in the inside of the knee, and up and down behind the thigh\"          OBJECTIVE      45 min Therapeutic Exercise:  [x]  See flow sheet   Rationale:      increase ROM, increase strength, improve coordination, improve balance and increase proprioception to improve the patients ability to  safely perform ADLs/transfers/squatting/prolong stding and ambulation/stair negotiation with minimal or no c/o pain\    9 min Therapeutic Activity: [x]  See flow sheet  postural/bed mobility training  car transfers   Rationale:    increase ROM, increase strength and improve coordination to improve the patients ability to safely perform ADLs/ bending/stooping/ lifting/prolong sitting, stding and amb/ stairs with minimal to no pain    Billed With/As:   [x] TE   [x] TA   [] Neuro   [] Self Care Patient Education: [x] Review HEP    [] Progressed/Changed HEP based on:   [x] positioning   [x] body mechanics   [x] transfers   [x] heat/ice application    [x] other: Pt ed on importance and benefits of compliance with HEP, core strength/stability and proper posture; pt verbalized understanding       Other Objective/Functional Measures:  VCs + demo to perform proper technique for TE    Initiated TE per flowsheet  rep knee ext- TKE- abolished medial knee pain in left    MEME- abolished pain   REIL-B/B maintained pain free    Reviewed proper bed mobility, sleeping positions, and importance and benefits of a neutral spine     Post Treatment Pain Level (on 0 to 10) scale:   0  / 10     ASSESSMENT  Assessment/Changes in Function:   able to abolish LLE with MEME/REIL  demos decrease Left knee AROM flex   decrease HS flexibility    []  See Progress Note/Recertification   Patient will continue to benefit from skilled PT services to modify and progress therapeutic interventions, address functional mobility deficits, address ROM deficits, address strength deficits, analyze and address soft tissue restrictions, analyze and cue movement patterns, analyze and modify body mechanics/ergonomics, assess and modify postural abnormalities and instruct in home and community integration to attain remaining goals.    Progress toward goals / Updated goals:  Pt's first visit since IE, no noted progress        PLAN  [x]  Upgrade activities as tolerated no Continue plan of care   []  Discharge due to :    []  Other:      Therapist: Karina Mckngiht PTA    Date: 9/8/2020 Time: 8:18 AM     Future Appointments   Date Time Provider Jomar Henson   9/8/2020  9:30 AM Anika Machado PTA [facility] [address]   9/10/2020  8:00 AM Monika Bhatia, PT [facility] [address]   9/15/2020  8:45 AM Anika Machado PTA [facility] [address]   9/17/2020  8:45 AM Anika Machado PTA [facility] [address]   9/18/2020  9:00 AM [facility] Ashley [facility] OpenPM   9/18/2020  9:20 AM Liza Heath NP [facility] OpenPM   9/22/2020  8:45 AM Monika Bhatia PT [facility] [address]   9/24/2020  9:30 AM Monika Bhatia PT [facility] [address]   9/29/2020  8:45 AM Anika Machado PTA [facility] [address]

## 2020-09-10 ENCOUNTER — HOSPITAL ENCOUNTER (OUTPATIENT)
Dept: PHYSICAL THERAPY | Age: 75
Discharge: HOME | End: 2020-09-10
Payer: MEDICARE

## 2020-09-10 PROCEDURE — 97110 THERAPEUTIC EXERCISES: CPT

## 2020-09-10 NOTE — PROGRESS NOTES
PHYSICAL THERAPY - DAILY TREATMENT NOTE    Patient Name: Lucretia Lamas        Date: 9/10/2020  : 1945   YES Patient  Verified  Visit #:   3     Insurance: Payor: Amina Rios / Plan: [place] MEDICARE PART A & B / Product Type: Medicare /      In time: 7:50 Out time: 8:45   Total Treatment Time: 55     Medicare/BCBS Time Tracking (below)   Total Timed Codes (min):  55 1:1 Treatment Time:  55     TREATMENT AREA = Pain in left knee [M25.562]    SUBJECTIVE    Pain Level (on 0 to 10 scale):  2  / 10   Medication Changes/New allergies or changes in medical history, any new surgeries or procedures? NO    If yes, update Summary List   Subjective Functional Status/Changes:  []  No changes reported     \"If you asked me yesterday I would have said the knee felt great. But this morning it's acting up a bit for some reason. \"   \"The knee is doing better in bending but I'm still having a hard time putting my foot up to get my shoe on. OBJECTIVE     Therapeutic Procedures:  Min Procedure Specifics + Rationale   n/a [x]  Patient Education (performed throughout session) [x] Review HEP    [x] Progressed/Changed HEP based on:   [x] proper performance and advancement of Therex/TA   [] reduction in pain level    [x] increased functional capacity       [] change in directional preference   55(55) [x] Therapeutic Exercise   [x]  See Flowsheet   Rationale: increase ROM and increase strength to improve the patients ability to participate in ADL's        Other Objective/Functional Measures:     Added new therex per flow sheet     Post Treatment Pain Level (on 0 to 10) scale:   0  / 10     ASSESSMENT    Assessment/Changes in Function:     Report of left buttock pain during captain shreya, abolished after DARVIN         Patient will continue to benefit from skilled PT services to modify and progress therapeutic interventions, address functional mobility deficits, address ROM deficits, address strength deficits, analyze and address soft tissue restrictions, analyze and cue movement patterns, analyze and modify body mechanics/ergonomics and instruct in home and community integration  to attain remaining goals   Progress toward goals / Updated goals:    Progressing well, L/S secondary problem confirmed     PLAN    [x]  Upgrade activities as tolerated  [x]  Update interventions per flow sheet YES Continue plan of care   []  Discharge due to :    []  Other:      Therapist: Mina Deor \"BJ\" Regine, JEFFT, Cert. MDT, Cert. DN, Cert. SMT, Dip.  Osteopractic    Date: 9/10/2020 Time: 7:58 AM     Future Appointments   Date Time Provider Jomar Henson   9/10/2020  8:00 AM Bala Ricks, REILLY [facility]   9/15/2020  8:45 AM Anup Cuadra PTA [facility]   9/17/2020  8:45 AM Anup Cuadra PTA [facility]   9/18/2020  9:00 AM [facility] Priya Bonilla [facility] OpenPM   9/18/2020  9:20 AM Thor Heath, OFELIA [facility] OpenPM   9/22/2020  8:45 AM Bala Ricks PT [facility]   9/24/2020  9:30 AM Bala Ricks PT [facility]   9/29/2020  8:45 AM Angelica Middleton PTA [facility]

## 2020-09-15 ENCOUNTER — HOSPITAL ENCOUNTER (OUTPATIENT)
Dept: PHYSICAL THERAPY | Age: 75
Discharge: HOME | End: 2020-09-15
Payer: MEDICARE

## 2020-09-15 PROCEDURE — 97110 THERAPEUTIC EXERCISES: CPT

## 2020-09-15 PROCEDURE — 97530 THERAPEUTIC ACTIVITIES: CPT

## 2020-09-15 NOTE — PROGRESS NOTES
PHYSICAL THERAPY - DAILY TREATMENT NOTE    Patient Name: Alberto Patel        Date: 9/15/2020  : 1945   yes Patient  Verified  Visit #:     Insurance: Payor: Jesse Nix / Plan: [place] MEDICARE PART A & B / Product Type: Medicare /      In time: 354 Out time: 028   Total Treatment Time: 46     Medicare/BCBS Time Tracking (below)   Total Timed Codes (min):  46 1:1 Treatment Time:   46     TREATMENT AREA =  Pain in left knee [M25.562]    SUBJECTIVE  Pain Level (on 0 to 10 scale):  2  / 10   Medication Changes/New allergies or changes in medical history, any new surgeries or procedures?    no  If yes, update Summary List   Subjective Functional Status/Changes:  []  No changes reported     \"I been doing my HEP, it helps.  I do feel the weakness in the left more but its getting stronger \"      OBJECTIVE      36 min Therapeutic Exercise:  [x]  See flow sheet   Rationale:      increase ROM, increase strength, improve coordination, improve balance and increase proprioception to improve the patients ability to  safely perform ADLs/transfers/squatting/prolong stding and ambulation/stair negotiation with minimal or no c/o pain\    10 min Therapeutic Activity: [x]  See flow sheet  postural/bed mobility training  sit <>std without UE   piriformis str for donning/doffing shoes and socks with ease   Rationale:    increase ROM, increase strength and improve coordination to improve the patients ability to safely perform ADLs/ bending/stooping/ lifting/prolong sitting, stding and amb/ stairs with minimal to no pain    Billed With/As:   [x] TE   [x] TA   [] Neuro   [] Self Care Patient Education: [x] Review HEP    [] Progressed/Changed HEP based on:   [x] positioning   [x] body mechanics   [x] transfers   [x] heat/ice application    [x] other: Pt ed on importance and benefits of compliance with HEP, core strength/stability and proper posture; pt verbalized understanding       Other Objective/Functional Measures:  VCs + demo to perform proper technique for TE  no c/o LLE radic sxs during TE    Initiated sit <>std without UE, and increased reps without c/o p!    demos good quad activation with TKE and SAQs     Post Treatment Pain Level (on 0 to 10) scale:   0  / 10     ASSESSMENT  Assessment/Changes in Function:   Progressed there-ex without c/o increase p!  demos symmetrical WBIng with sit <>stand without UE asisst  lacks decrease stance time on LLE during gait, improved with VCs   []  See Progress Note/Recertification   Patient will continue to benefit from skilled PT services to modify and progress therapeutic interventions, address functional mobility deficits, address ROM deficits, address strength deficits, analyze and address soft tissue restrictions, analyze and cue movement patterns, analyze and modify body mechanics/ergonomics, assess and modify postural abnormalities and instruct in home and community integration to attain remaining goals. Progress toward goals / Updated goals: · Short Term Goals: To be accomplished in  3  weeks:  1. Patient to be adherent to HEP to facilitate pain control with ADL's. Established HEP  2. Patient to demonstrate left knee PROM extension to 0 degrees to facilitate a more normalized gait. 3. Patient to demonstrate a normalized gait pattern. progressing, continues to require VCing for LLE WBing  4. Patient to Centralize left LE sx to level of L/S to demonstrate effectiveness of directional preference exercises and decrease risk of LE dysfunction  · Long Term Goals: To be accomplished in  6  weeks:  1. Patient to be Safe and Independent with HEP to self-manage/prevent symptoms after DC. 2. Patient to demonstrate safe stair negotiation in reciprocal pattern to facilitate community mobility and ease in accessing the second floor of his home. 3. Patient to increase FOTO score to > 63 to indicate improved functional independence.      PLAN  [x]  Upgrade activities as tolerated no Continue plan of care   []  Discharge due to :    []  Other:      Therapist: Renu Sinha PTA    Date: 9/15/2020 Time: 8:18 AM     Future Appointments   Date Time Provider Jomar Henson   9/17/2020  8:45 AM Lindy Balderas PTA [facility]   9/18/2020  9:00 AM [facility] OpenPM   9/18/2020  9:20 AM Flor Heath, NP [facility] OpenPM   9/22/2020  8:45 AM Yoli Allan, PT [facility]   9/24/2020  9:30 AM Yoli Allan PT [facility]   9/29/2020  8:45 AM Angelica Middleton PTA [facility]

## 2020-09-17 ENCOUNTER — HOSPITAL ENCOUNTER (OUTPATIENT)
Dept: PHYSICAL THERAPY | Age: 75
Discharge: HOME | End: 2020-09-17
Payer: MEDICARE

## 2020-09-17 PROCEDURE — 97140 MANUAL THERAPY 1/> REGIONS: CPT

## 2020-09-17 PROCEDURE — 97110 THERAPEUTIC EXERCISES: CPT

## 2020-09-17 NOTE — PROGRESS NOTES
PHYSICAL THERAPY - DAILY TREATMENT NOTE    Patient Name: Emy Lord        Date: 2020  : 1945   yes Patient  Verified  Visit #:      12  Insurance: Payor: Mateus Tan / Plan: [place] MEDICARE PART A & B / Product Type: Medicare /      In time: 445 Out time: 874   Total Treatment Time: 56     Medicare/BCBS Time Tracking (below)   Total Timed Codes (min):  56 1:1 Treatment Time:   56     TREATMENT AREA =  Pain in left knee [M25.562]    SUBJECTIVE  Pain Level (on 0 to 10 scale): 4  / 10 in late left knee   Medication Changes/New allergies or changes in medical history, any new surgeries or procedures?    no  If yes, update Summary List   Subjective Functional Status/Changes:  []  No changes reported     \"It started during my ride on my bike, I ride about 8 miles, I do not really warm-up \"      OBJECTIVE       46 min Therapeutic Exercise:  [x]  See flow sheet   Rationale:      increase ROM, increase strength, improve coordination, improve balance and increase proprioception to improve the patients ability to  safely perform ADLs/transfers/squatting/prolong stding and ambulation/stair negotiation with minimal or no c/o pain\    10 min Manual Therapy: IASTM static cupping to med/lateral/superior knee while performing LAQs  Patient educated on purpose of Instrument Assisted Soft Tissue Mobilization, neurophysiological effects, biochemical effects, and biomechanical effects in relation to left knee condition. pt verbalized understanding. Patient assessed for appropriateness pre-IASTM, after which consent to proceed was given. Therapist discussed post-IATSM effects and any adverse reactions (if appropriate) to determine further PT intervention through IASTM.       Rationale:      decrease pain, increase ROM, increase tissue extensibility, decrease edema , decrease trigger points and increase postural awareness to improve patient's ability to  safely perform ADLs/transfers/squatting/prolong cycling, stding and ambulation/stair negotiation with minimal or no c/o pain          Billed With/As:   [x] TE   [x] TA   [] Neuro   [] Self Care Patient Education: [x] Review HEP    [] Progressed/Changed HEP based on:   [x] positioning   [x] body mechanics   [x] transfers   [x] heat/ice application    [x] other: Pt ed on importance and benefits of compliance with HEP, core strength/stability and proper posture; pt verbalized understanding  Pt ed on importance and benefits of compliance with HEP, core strength/stability and proper posture; pt verbalized understanding  See updated HEP in chart       Other Objective/Functional Measures:  MEME> REIL- P/B abolished left knee pain  initiated retro amb on TM, produced 2/10 knee pain, able to abolish with REIL    VCs + demo to perform proper technique for TE  demos decrease left knee flex AROM and decrease HS flexibility L>R     Post Treatment Pain Level (on 0 to 10) scale:   0  / 10     ASSESSMENT  Assessment/Changes in Function:   Progressed there-ex without c/o increase p!  able to abolish radic sxs and be pain free with REIL   []  See Progress Note/Recertification   Patient will continue to benefit from skilled PT services to modify and progress therapeutic interventions, address functional mobility deficits, address ROM deficits, address strength deficits, analyze and address soft tissue restrictions, analyze and cue movement patterns, analyze and modify body mechanics/ergonomics, assess and modify postural abnormalities and instruct in home and community integration to attain remaining goals. Progress toward goals / Updated goals: · Short Term Goals: To be accomplished in  3  weeks:  1. Patient to be adherent to HEP to facilitate pain control with ADL's.mod compliance   2. Patient to demonstrate left knee PROM extension to 0 degrees to facilitate a more normalized gait. 3. Patient to demonstrate a normalized gait pattern. progressing, continues to require VCing for LLE WBing  4. Patient to Centralize left LE sx to level of L/S to demonstrate effectiveness of directional preference exercises and decrease risk of LE dysfunction MET  · Long Term Goals: To be accomplished in  6  weeks:  1. Patient to be Safe and Independent with HEP to self-manage/prevent symptoms after DC. 2. Patient to demonstrate safe stair negotiation in reciprocal pattern to facilitate community mobility and ease in accessing the second floor of his home. 3. Patient to increase FOTO score to > 63 to indicate improved functional independence.      PLAN  [x]  Upgrade activities as tolerated no Continue plan of care   []  Discharge due to :    []  Other:      Therapist: Joseph Hernandez PTA    Date: 9/17/2020 Time: 1:17 PM     Future Appointments   Date Time Provider Jomar Henson   9/18/2020  9:00 AM [facility] OpenPM   9/18/2020  9:20 AM Monse Gomez NP [facility] OpenPM   9/22/2020  8:45 AM Hal Huynh PT [facility]   9/29/2020  8:45 AM [facility]

## 2020-09-22 ENCOUNTER — HOSPITAL ENCOUNTER (OUTPATIENT)
Dept: PHYSICAL THERAPY | Age: 75
Discharge: HOME | End: 2020-09-22
Payer: MEDICARE

## 2020-09-22 PROCEDURE — 97530 THERAPEUTIC ACTIVITIES: CPT

## 2020-09-22 PROCEDURE — 97110 THERAPEUTIC EXERCISES: CPT

## 2020-09-22 NOTE — PROGRESS NOTES
PHYSICAL THERAPY - DAILY TREATMENT NOTE    Patient Name: Cari Dancer        Date: 2020  : 1945   YES Patient  Verified  Visit #:     Insurance: Payor: Ana Cones / Plan: [place] MEDICARE PART A & B / Product Type: Medicare /      In time: 8:40 Out time: 9:33   Total Treatment Time: 53     Medicare/BCBS Time Tracking (below)   Total Timed Codes (min):  53 1:1 Treatment Time:  53     TREATMENT AREA = Pain in left knee [M25.562]    SUBJECTIVE    Pain Level (on 0 to 10 scale):  0  / 10   Medication Changes/New allergies or changes in medical history, any new surgeries or procedures? NO    If yes, update Summary List   Subjective Functional Status/Changes:  []  No changes reported     \"I'm actually doing pretty good. No pain today. Not much since last time. The big issue I get now is when I (pivot) or come down the stairs. \"          OBJECTIVE     Therapeutic Procedures:  Min Procedure Specifics + Rationale   n/a [x]  Patient Education (performed throughout session) [x] Review HEP    [] Progressed/Changed HEP based on:   [] proper performance and advancement of Therex/TA   [] reduction in pain level    [] increased functional capacity       [] change in directional preference   45(45) [x] Therapeutic Exercise   [x]  See Flowsheet   Rationale: increase ROM and increase strength to improve the patients ability to participate in ADL's    8(8) [x] Therapeutic Activity   [x]  See Flowsheet  Re-assessment  Rationale: To improve safety, proprioception, coordination, and efficiency with tasks         Other Objective/Functional Measures: Added hip 3-way.    See PN     Post Treatment Pain Level (on 0 to 10) scale:   0  / 10     ASSESSMENT    Assessment/Changes in Function:       See PN         Patient will continue to benefit from skilled PT services to modify and progress therapeutic interventions, address functional mobility deficits, address ROM deficits, address strength deficits, analyze and address soft tissue restrictions, analyze and cue movement patterns, analyze and modify body mechanics/ergonomics and instruct in home and community integration  to attain remaining goals   Progress toward goals / Updated goals:    See PN     PLAN    [x]  Upgrade activities as tolerated  [x]  Update interventions per flow sheet YES Continue plan of care   []  Discharge due to :    []  Other:      Therapist: Prosper Hayes \"BJ\" MARY Weiner, Cert. MDT, Cert. DN, Cert. SMT, Dip.  Osteopractic    Date: 9/22/2020 Time: 8:45 AM     Future Appointments   Date Time Provider Jomar Henson   9/29/2020  8:45 AM Jerry Humphries PTA [facility]   1/28/2021  9:00 AM Alcira Heath, NP [facility]

## 2020-09-22 NOTE — PROGRESS NOTES
[address] [facility] PHYSICAL THERAPY  [address] [facility] [place] [address], [address] - Phone: [phone]  Fax: [phone]  PROGRESS NOTE  Patient Name: Denice Curiel :    Treatment/Medical Diagnosis: Pain in left knee [M25.562]   Referral Source: Noemi Mathews MD     Date of Initial Visit: 9/3/2020 Attended Visits: 6 Missed Visits: 0     SUMMARY OF TREATMENT  Patient's POC has consisted of therex, therapeutic activities, manual therapy prn, modalities prn, pt. education, and a comprehensive HEP. Treatment strategies used to address functional mobility deficits, ROM deficits, strength deficits, analyze and address soft tissue restrictions, analyze and cue movement patterns, analyze and modify body mechanics/ergonomics, assess and modify postural abnormalities and instruct in home and community integration. CURRENT STATUS  L/S symptoms have nearly resolved, with radicular presentation to top of left buttocks at worst when there's a recurrence. Left knee pain free when ambulating on even surface in straight line, present with stair descent and pivoting. Activity levels have improved to allow return to recreational biking, during which he is able to ride for 8 miles. Current knee AROM = 0-115 degrees with pain at end range flexion. PROM = 0-117 degrees with pain at end range. Goal/Measure of Progress Goal Met? 1. Patient to be adherent to HEP to facilitate pain control with ADL's.  2. Patient to demonstrate left knee PROM extension to 0 degrees to facilitate a more normalized gait. 3. Patient to demonstrate a normalized gait pattern. 4. Patient to Centralize left LE sx to level of L/S to demonstrate effectiveness of directional preference  MET    MET      MET    Partially Met     New Goals to be achieved in __2-4__  weeks:  1. Patient to be Safe and Independent with HEP to self-manage/prevent symptoms after DC.   2. Patient to demonstrate safe stair negotiation in reciprocal pattern to facilitate community mobility and ease in accessing the second floor of his home. 3. Patient to increase FOTO score to > 63 to indicate improved functional independence. 4. Patient to demonstrate left knee AROM flexion to > 110 degrees to facilitate ease in donning/doffing    Frequency / Duration:   Patient to be seen  2  times per week for 2-4  weeks:    RECOMMENDATIONS  Continue and progress functional therex/therapeutic activity as able, utilizing manual therapy and modalities prn. Progress patient towards independent HEP to facilitate self-management of symptoms and progress gains after PT. If you have any questions/comments please contact us directly at [phone]. Thank you for allowing us to assist in the care of your patient. Therapist Signature: Thea Baltazar \"BJ\" Donell Rodriguez, MARY, Cert. MDT, Cert. DN, Cert. SMT, Dip. Osteopractic Date: 5/46/6085   Certification Period: 9/3/2020-12/2/2020     Reporting Period 9/3/2020-9/22/2020   Time: 8:48 AM   NOTE TO PHYSICIAN:  PLEASE COMPLETE THE ORDERS BELOW AND FAX TO   [facility] Physical [facility]: [phone]. If you are unable to process this request in 24 hours please contact our office: [phone].    ___ I have read the above report and request that my patient continue as recommended.   ___ I have read the above report and request that my patient continue therapy with the following changes/special instructions:_________________________________________________________   ___ I have read the above report and request that my patient be discharged from therapy.      Physician Signature:        Date:       Time:

## 2020-09-24 ENCOUNTER — APPOINTMENT (OUTPATIENT)
Dept: PHYSICAL THERAPY | Age: 75
End: 2020-09-24
Payer: MEDICARE

## 2020-09-29 ENCOUNTER — HOSPITAL ENCOUNTER (OUTPATIENT)
Dept: PHYSICAL THERAPY | Age: 75
Discharge: HOME | End: 2020-09-29
Payer: MEDICARE

## 2020-09-29 PROCEDURE — 97110 THERAPEUTIC EXERCISES: CPT

## 2020-09-29 PROCEDURE — 97530 THERAPEUTIC ACTIVITIES: CPT

## 2020-09-29 NOTE — PROGRESS NOTES
PHYSICAL THERAPY - DAILY TREATMENT NOTE    Patient Name: Alaina Kaplan        Date: 2020  : 1945   yes Patient  Verified  Visit #:     Insurance: Payor: Kika Luis / Plan: [place] MED[place] PART A & B / Product Type: Medicare /      In time: 642 Out time: 940   Total Treatment Time: 55     Medicare/BCBS Time Tracking (below)   Total Timed Codes (min):  55 1:1 Treatment Time:  55     TREATMENT AREA =  Pain in left knee [M25.562]    SUBJECTIVE  Pain Level (on 0 to 10 scale):  3  / 10   Medication Changes/New allergies or changes in medical history, any new surgeries or procedures?    no  If yes, update Summary List   Subjective Functional Status/Changes:  []  No changes reported     \"its on the side of the knee and theres a swollen part on this side there (lateral knee)\"          OBJECTIVE      45 min Therapeutic Exercise:  [x]  See flow sheet   Rationale:      increase ROM, increase strength, improve coordination, improve balance and increase proprioception to improve the patients ability to  safely perform ADLs/transfers/squatting/prolong stding and ambulation/stair negotiation with minimal or no c/o pain\    10 min Manual Therapy: AISTM static cupping to superior/lateral left knee while performing LAQs and sit <>std without UE   Rationale:      decrease pain, increase ROM, increase tissue extensibility, decrease edema , decrease trigger points and increase postural awareness to improve patient's ability to safely perform ADLs/ bending/stooping/ lifting/prolong sitting, stding and amb/ stairs with minimal to no pain        Billed With/As:   [x] TE   [x] TA   [] Neuro   [] Self Care Patient Education: [x] Review HEP    [] Progressed/Changed HEP based on:   [x] positioning   [x] body mechanics   [x] transfers   [x] heat/ice application    [x] other: Pt ed on importance and benefits of compliance with HEP, core strength/stability and proper posture; pt verbalized understanding       Other Objective/Functional Measures:  VCs + demo to perform proper technique for TE    Initiated sit <>std with static cupping without c/o p!    demos ~ 95 degs of knee flex AROM in prone   Post Treatment Pain Level (on 0 to 10) scale:   0  / 10     ASSESSMENT  Assessment/Changes in Function:   able to abolish LLE radic and left knee pain with MEME/REIL   demos symmetrical WBIng with sit <>stand without UE asisst     []  See Progress Note/Recertification   Patient will continue to benefit from skilled PT services to modify and progress therapeutic interventions, address functional mobility deficits, address ROM deficits, address strength deficits, analyze and address soft tissue restrictions, analyze and cue movement patterns, analyze and modify body mechanics/ergonomics, assess and modify postural abnormalities and instruct in home and community integration to attain remaining goals. Progress toward goals / Updated goals:  New Goals to be achieved in __2-4__  weeks from 9/22/2020:  1. Patient to be Safe and Independent with HEP to self-manage/prevent symptoms after DC. 2. Patient to demonstrate safe stair negotiation in reciprocal pattern to facilitate community mobility and ease in accessing the second floor of his home. 3. Patient to increase FOTO score to > 63 to indicate improved functional independence. 4. Patient to demonstrate left knee AROM flexion to > 110 degrees to facilitate ease in donning/doffing.  progressing, performing prone knee flex with belt     PLAN  [x]  Upgrade activities as tolerated no Continue plan of care   []  Discharge due to :    []  Other:      Therapist: Linda Ray PTA    Date: 9/29/2020 Time: 10:03 AM     Future Appointments   Date Time Provider Jomar Henson   10/6/2020  8:45 AM Franci Rodriguez [facility] [address]   10/8/2020  8:45 AM Dejon Alcocer, PT [facility] [address]   10/13/2020  8:45 AM Dejon Alcocer, PT [facility] [address]   10/15/2020  8:45 AM Tabitha Myers PTA [facility] [address]   1/28/2021 9:00 AM Damon Heath NP [facility]

## 2020-10-06 ENCOUNTER — HOSPITAL ENCOUNTER (OUTPATIENT)
Dept: PHYSICAL THERAPY | Age: 75
Discharge: HOME | End: 2020-10-06
Payer: MEDICARE

## 2020-10-06 PROCEDURE — 97110 THERAPEUTIC EXERCISES: CPT

## 2020-10-06 PROCEDURE — 97140 MANUAL THERAPY 1/> REGIONS: CPT

## 2020-10-06 PROCEDURE — 97530 THERAPEUTIC ACTIVITIES: CPT

## 2020-10-06 NOTE — PROGRESS NOTES
PHYSICAL THERAPY - DAILY TREATMENT NOTE    Patient Name: Rajeev Getting        Date: 10/6/2020  : 1945   yes Patient  Verified  Visit #:     Insurance: Payor: Alyssa Graves / Plan: [place] MEDICARE PART A & B / Product Type: Medicare /      In time: 588 Out time: 950   Total Treatment Time: 65     Medicare/BCBS Time Tracking (below)   Total Timed Codes (min):  55 1:1 Treatment Time:  55     TREATMENT AREA =  Pain in left knee [M25.562]    SUBJECTIVE  Pain Level (on 0 to 10 scale):   2  / 10   Medication Changes/New allergies or changes in medical history, any new surgeries or procedures?    no  If yes, update Summary List   Subjective Functional Status/Changes:  []  No changes reported     \" I got a shot yesterday, the jelly shot (prolo therapy).  They said it takes 2 mos to see a difference\"          OBJECTIVE  Modalities Rationale:     decrease inflammation and decrease pain to improve patient's ability to  safely perform ADLs/transfers/squatting/prolong stding and ambulation/stair negotiation with minimal or no c/o pain     min [] Estim, type/location:                                      []  att     []  unatt     []  w/US     []  w/ice    []  w/heat    min []  Mechanical Traction: type/lbs                   []  pro   []  sup   []  int   []  cont    []  before manual    []  after manual    min []  Ultrasound, settings/location:      min []  Iontophoresis w/ dexamethasone, location:                                               []  take home patch       []  in clinic   10 min [x]  Ice     []  Heat    location/position: Supine with wedge under B LEs with 1/2 FR under ankle for TKE    min []  Vasopneumatic Device, press/temp:     min []  Other:    [x] Skin assessment post-treatment (if applicable):    [x]  intact    []  redness- no adverse reaction     []redness - adverse reaction:            33 min Therapeutic Exercise:  [x]  See flow sheet   Rationale:      increase ROM, increase strength, improve coordination, improve balance and increase proprioception to improve the patients ability to  safely perform ADLs/transfers/squatting/prolong stding and ambulation/stair negotiation with minimal or no c/o pain\    10 min Manual Therapy: AISTM static cupping to superior/lateral left knee while performing LAQs, TKE and sit <>std without UE   Rationale:      decrease pain, increase ROM, increase tissue extensibility, decrease edema , decrease trigger points and increase postural awareness to improve patient's ability to safely perform ADLs/ bending/stooping/ lifting/prolong sitting, stding and amb/ stairs with minimal to no pain    12 min Therapeutic Activity: step ups  side stepping   sit <>std without UE  HRs for push off and TRs for ankle HS during gait   Rationale:    increase ROM, increase strength and improve coordination to improve the patients ability to safely perform ADLs/ bending/stooping/ lifting/prolong sitting, stding and amb/ stairs with minimal to no pain          Billed With/As:   [x] TE   [x] TA   [] Neuro   [] Self Care Patient Education: [x] Review HEP    [] Progressed/Changed HEP based on:   [x] positioning   [x] body mechanics   [x] transfers   [x] heat/ice application    [x] other: Pt ed on importance and benefits of compliance with HEP, core strength/stability and proper posture; pt verbalized understanding       Other Objective/Functional Measures:  VCs + demo to perform proper technique for TE    Initiated lateral amb with PTB, step ups and increased reps without c/o p!    left knee AROM =0- degs, significant decrease since measured 9/22/2020 at 0-115 degs, pt instructed to work on AROM every hr with HEP;pt verbalized understanding    c/o hip fatigue with lateral amb, not pain   Post Treatment Pain Level (on 0 to 10) scale:  1  / 10     ASSESSMENT  Assessment/Changes in Function:   Progressed there-ex without c/o increase p!  able to follow instruction and demo slow ecc lowering off 6\" step to promote quad strength     []  See Progress Note/Recertification   Patient will continue to benefit from skilled PT services to modify and progress therapeutic interventions, address functional mobility deficits, address ROM deficits, address strength deficits, analyze and address soft tissue restrictions, analyze and cue movement patterns, analyze and modify body mechanics/ergonomics, assess and modify postural abnormalities and instruct in home and community integration to attain remaining goals. Progress toward goals / Updated goals:  New Goals to be achieved in __2-4__  weeks from 9/22/2020:  1. Patient to be Safe and Independent with HEP to self-manage/prevent symptoms after DC. 2. Patient to demonstrate safe stair negotiation in reciprocal pattern to facilitate community mobility and ease in accessing the second floor of his home. 3. Patient to increase FOTO score to > 63 to indicate improved functional independence. 4. Patient to demonstrate left knee AROM flexion to > 110 degrees to facilitate ease in donning/doffing.  progressing, Sidumula 60  [x]  Upgrade activities as tolerated no Continue plan of care   []  Discharge due to :    []  Other:      Therapist: Digna Gillespie PTA    Date: 10/6/2020 Time: 7:24 AM     Future Appointments   Date Time Provider Jomar Henson   10/6/2020  8:45 AM Mati Perkins [facility]   10/8/2020  8:45 AM Jose Rafael Clifton PT [facility]   10/13/2020  8:45 AM Jose Rafael Clifton PT [facility]   10/15/2020  8:45 [facility]   1/28/2021  9:00 AM Gurpreet Heath, NP [facility]

## 2020-10-08 ENCOUNTER — HOSPITAL ENCOUNTER (OUTPATIENT)
Dept: PHYSICAL THERAPY | Age: 75
Discharge: HOME | End: 2020-10-08
Payer: MEDICARE

## 2020-10-08 PROCEDURE — 97530 THERAPEUTIC ACTIVITIES: CPT

## 2020-10-08 PROCEDURE — 97016 VASOPNEUMATIC DEVICE THERAPY: CPT

## 2020-10-08 PROCEDURE — 97110 THERAPEUTIC EXERCISES: CPT

## 2020-10-08 NOTE — PROGRESS NOTES
PHYSICAL THERAPY - DAILY TREATMENT NOTE    Patient Name: Rhoda Banuelos        Date: 10/8/2020  : 1945   YES Patient  Verified  Visit #:     Insurance: Payor: Jamarcus Erickson / Plan: [place] MEDICARE PART A & B / Product Type: Medicare /      In time: 8:39 Out time: 9:34   Total Treatment Time: 55     Medicare/BCBS Time Tracking (below)   Total Timed Codes (min):  55 1:1 Treatment Time:  55     TREATMENT AREA = Pain in left knee [M25.562]    SUBJECTIVE    Pain Level (on 0 to 10 scale):  1-2  / 10   Medication Changes/New allergies or changes in medical history, any new surgeries or procedures? NO    If yes, update Summary List   Subjective Functional Status/Changes:  []  No changes reported     \"I tried to stretch it as often as I could the other day. \"          OBJECTIVE     Therapeutic Procedures:  Min Procedure Specifics + Rationale   n/a [x]  Patient Education (performed throughout session) [x] Review HEP    [] Progressed/Changed HEP based on:   [] proper performance and advancement of Therex/TA   [] reduction in pain level    [] increased functional capacity       [] change in directional preference   30 [x] Therapeutic Exercise   [x]  See Flowsheet   Rationale: increase ROM and increase strength to improve the patients ability to participate in ADL's    10(10) [x] Therapeutic Activity   [x]  See Flowsheet  Agility Ladder: Zig Zag, mini-zigzag, in/out sidestep, in/out forward, sidestepping, HK, lateral jb jb. Rationale: To improve safety, proprioception, coordination, and efficiency with tasks     Modality rationale: decrease inflammation, decrease pain, increase tissue extensibility and increase muscle contraction/control to improve the patients ability to perform ADL's with greater ease       Min Type Additional Details   15 [x] Vasopneumatic Device  Attended? NO Location: Left Knee  [x] supine              [] prone  [] legs elevated   [] legs flat  []w/heat  []w/ice  [] sitting    [x] Skin assessment post-treatment:  [x]intact [x]redness- no adverse reaction       []redness  adverse reaction:     Other Objective/Functional Measures:    Educated patient re: Game Ready Device  Performed agility ladder with  Minimal LoB     Post Treatment Pain Level (on 0 to 10) scale:   0  / 10     ASSESSMENT    Assessment/Changes in Function:     Performed therex and activities with minimal complaints        Patient will continue to benefit from skilled PT services to modify and progress therapeutic interventions, address functional mobility deficits, address ROM deficits, address strength deficits, analyze and address soft tissue restrictions, analyze and cue movement patterns, analyze and modify body mechanics/ergonomics, assess and modify postural abnormalities, address imbalance/dizziness and instruct in home and community integration  to attain remaining goals   Progress toward goals / Updated goals:    Progressing in ambulatory status. Continues with limited ROM in extension/flexion. PLAN    [x]  Upgrade activities as tolerated  [x]  Update interventions per flow sheet YES Continue plan of care   []  Discharge due to :    []  Other:      Therapist: Lane Londono \"BJ\" MARY Weiner, Cert. MDT, Cert. DN, Cert. SMT, Dip.  Osteopractic    Date: 10/8/2020 Time: 8:45 AM     Future Appointments   Date Time Provider Jomar Henson   10/15/2020  8:45 AM Halima Cowan PTA [facility]   1/28/2021  9:00 AM Frank Heath, NP [facility]

## 2020-10-13 ENCOUNTER — APPOINTMENT (OUTPATIENT)
Dept: PHYSICAL THERAPY | Age: 75
End: 2020-10-13
Payer: MEDICARE

## 2020-10-15 ENCOUNTER — HOSPITAL ENCOUNTER (OUTPATIENT)
Dept: PHYSICAL THERAPY | Age: 75
Discharge: HOME | End: 2020-10-15
Payer: MEDICARE

## 2020-10-15 PROCEDURE — 97110 THERAPEUTIC EXERCISES: CPT

## 2020-10-15 PROCEDURE — 97530 THERAPEUTIC ACTIVITIES: CPT

## 2020-10-15 PROCEDURE — 97140 MANUAL THERAPY 1/> REGIONS: CPT

## 2020-10-15 NOTE — PROGRESS NOTES
PHYSICAL THERAPY - DAILY TREATMENT NOTE    Patient Name: Kanwal Matthews. Date: 10/15/2020  : 1945   yes Patient  Verified  Visit #:   10  of   14  Insurance: Payor: Sanjay Vega / Plan: [place] MEDICARE PART A & B / Product Type: Medicare /      In time:  246 Out time: 1000   Total Treatment Time: 74     Medicare/BCBS Time Tracking (below)   Total Timed Codes (min):  64 1:1 Treatment Time:  55     TREATMENT AREA =  Pain in left knee [M25.562]    SUBJECTIVE  Pain Level (on 0 to 10 scale):   1-2 / 10   Medication Changes/New allergies or changes in medical history, any new surgeries or procedures?    no  If yes, update Summary List   Subjective Functional Status/Changes:  []  No changes reported     \" I have some swelling in the top but much better.  I was able to stretch but its tight\"          OBJECTIVE  Modalities Rationale:     decrease inflammation and decrease pain to improve patient's ability to  safely perform ADLs/transfers/squatting/prolong stding and ambulation/stair negotiation with minimal or no c/o pain     min [] Estim, type/location:                                      []  att     []  unatt     []  w/US     []  w/ice    []  w/heat    min []  Mechanical Traction: type/lbs                   []  pro   []  sup   []  int   []  cont    []  before manual    []  after manual    min []  Ultrasound, settings/location:      min []  Iontophoresis w/ dexamethasone, location:                                               []  take home patch       []  in clinic   10 min [x]  Ice     []  Heat    location/position: Supine with wedge under B LEs with 1/2 FR under ankle for TKE    min []  Vasopneumatic Device, press/temp:     min []  Other:    [x] Skin assessment post-treatment (if applicable):    [x]  intact    []  redness- no adverse reaction     []redness - adverse reaction:            29 min Therapeutic Exercise:  [x]  See flow sheet   Rationale:      increase ROM, increase strength, improve coordination, improve balance and increase proprioception to improve the patients ability to  safely perform ADLs/transfers/squatting/prolong stding and ambulation/stair negotiation with minimal or no c/o pain\    10 min Manual Therapy: KT to med/lat knee with \"0\" tech   + medial support    Rationale:      decrease pain, increase ROM, increase tissue extensibility, decrease edema , decrease trigger points and increase postural awareness to improve patient's ability to safely perform ADLs/ bending/stooping/ lifting/prolong sitting, stding and amb/ stairs with minimal to no pain    25 min Therapeutic Activity: step ups  side stepping   sit <>std without UE  HRs for push off and TRs for ankle HS during gait  SLS   balance on AE   Rationale:    increase ROM, increase strength and improve coordination to improve the patients ability to safely perform ADLs/ bending/stooping/ lifting/prolong sitting, stding and amb/ stairs with minimal to no pain          Billed With/As:   [x] TE   [x] TA   [] Neuro   [] Self Care Patient Education: [x] Review HEP    [] Progressed/Changed HEP based on:   [x] positioning   [x] body mechanics   [x] transfers   [x] heat/ice application    [x] other: Pt ed on importance and benefits of compliance with HEP, core strength/stability and proper posture; pt verbalized understanding       Other Objective/Functional Measures:  VCs + demo to perform proper technique for TE    Initiated SLS, lateral step up and increased reps without c/o p!    agility ladder: 2 in 2 out; HK, diagonal, lateral 2 in/out      SLS x 30\" without LOB     Post Treatment Pain Level (on 0 to 10) scale:  0  / 10     ASSESSMENT  Assessment/Changes in Function:   Progressed there-ex without c/o increase p!  requires 1 finger at times for SLS  (I) with agility ladder    []  See Progress Note/Recertification   Patient will continue to benefit from skilled PT services to modify and progress therapeutic interventions, address functional mobility deficits, address ROM deficits, address strength deficits, analyze and address soft tissue restrictions, analyze and cue movement patterns, analyze and modify body mechanics/ergonomics, assess and modify postural abnormalities and instruct in home and community integration to attain remaining goals. Progress toward goals / Updated goals:  New Goals to be achieved in __2-4__  weeks from 9/22/2020:  1. Patient to be Safe and Independent with HEP to self-manage/prevent symptoms after DC. 2. Patient to demonstrate safe stair negotiation in reciprocal pattern to facilitate community mobility and ease in accessing the second floor of his home. progressing, negotiating up 6'' step without difficulty   3. Patient to increase FOTO score to > 63 to indicate improved functional independence. 4. Patient to demonstrate left knee AROM flexion to > 110 degrees to facilitate ease in donning/doffing.  progressing, Sidumula 60  [x]  Upgrade activities as tolerated no Continue plan of care   []  Discharge due to :    []  Other:      Therapist: Janelle Maier PTA    Date: 10/15/2020 Time: 7:51 AM     Future Appointments   Date Time Provider Jomar Henson   10/15/2020  8:45 AM Yasmeen Terrell PTA [facility]   1/28/2021  9:00 AM Nu Heath NP [facility]

## 2020-10-20 ENCOUNTER — HOSPITAL ENCOUNTER (OUTPATIENT)
Dept: PHYSICAL THERAPY | Age: 75
Discharge: HOME | End: 2020-10-20
Payer: MEDICARE

## 2020-10-20 PROCEDURE — 97110 THERAPEUTIC EXERCISES: CPT

## 2020-10-20 PROCEDURE — 97530 THERAPEUTIC ACTIVITIES: CPT

## 2020-10-20 NOTE — PROGRESS NOTES
PHYSICAL THERAPY - DAILY TREATMENT NOTE    Patient Name: Cody Gautam. Date: 10/20/2020  : 1945   YES Patient  Verified  Visit #:   +  Insurance: Payor: Iam Pearce / Plan: [place] MEDICARE PART A & B / Product Type: Medicare /      In time: 1:10 Out time: 2:13   Total Treatment Time: 63     Medicare/BCBS Time Tracking (below)   Total Timed Codes (min):  63 1:1 Treatment Time:  53     TREATMENT AREA = Pain in left knee [M25.562]    SUBJECTIVE    Pain Level (on 0 to 10 scale):  0  / 10   Medication Changes/New allergies or changes in medical history, any new surgeries or procedures? NO    If yes, update Summary List   Subjective Functional Status/Changes:  []  No changes reported     \"I've been riding about 10 miles a night, that's when I get the swelling afterwards, but it goes down when I ice it. \"   \"Not really feeling much pain at all, but truth be told I went to the dentist yesterday and they gave me some stuff that's probably still taking effect. OBJECTIVE     Therapeutic Procedures:  Min Procedure Specifics + Rationale   n/a [x]  Patient Education (performed throughout session) [x] Review HEP    [] Progressed/Changed HEP based on:   [] proper performance and advancement of Therex/TA   [] reduction in pain level    [] increased functional capacity       [] change in directional preference   23(23) [x] Therapeutic Exercise   [x]  See Flowsheet   Rationale: increase ROM and increase strength to improve the patients ability to participate in ADL's    30(30) [x] Therapeutic Activity   [x]  See Flowsheet  Agility Ladder: Zig Zag, mini-zigzag, in/out sidestep, in/out forward, sidestepping, HK, lateral jb jb. Rationale:  To improve safety, proprioception, coordination, and efficiency with tasks     Modality rationale: decrease inflammation, decrease pain, increase tissue extensibility and increase muscle contraction/control to improve the patients ability to perform ADL's with greater ease       Min Type Additional Details   10 [x]  Cold Pack   [] pre-KAILEY      [x] post-KAILEY  []  Ice massage Location:Left Knee    [] supine              [] prone  [] legs elevated    [] legs flat   [] sitting   [x] Skin assessment post-treatment:  [x]intact [x]redness- no adverse reaction       []redness - adverse reaction:     Other Objective/Functional Measures:    therex revised per flow sheet     Post Treatment Pain Level (on 0 to 10) scale:   0  / 10     ASSESSMENT    Assessment/Changes in Function:       Report of \"catch\" during lateral step down to left, but transient          Patient will continue to benefit from skilled PT services to modify and progress therapeutic interventions, address functional mobility deficits, address ROM deficits, address strength deficits, analyze and address soft tissue restrictions, analyze and cue movement patterns, analyze and modify body mechanics/ergonomics, assess and modify postural abnormalities, address imbalance/dizziness and instruct in home and community integration  to attain remaining goals   Progress toward goals / Updated goals:    Improving in sagittal plane movement and recreational activity tolerance. PLAN    [x]  Upgrade activities as tolerated  [x]  Update interventions per flow sheet YES Continue plan of care   []  Discharge due to :    []  Other:      Therapist: Steve \"BJ\" MARY Weiner, Cert. MDT, Cert. DN, Cert. SMT, Dip.  Osteopractic    Date: 10/20/2020 Time: 1:28 PM     Future Appointments   Date Time Provider Jomar Henson   10/27/2020  1:15 PM Alex Farmer PT [facility]   10/29/2020 11:00 AM Alex Farmer PT [facility]   11/3/2020  8:45 AM Alex Farmer PT [facility]   11/5/2020  8:45 AM Alex Farmer PT [facility]   11/10/2020  8:45 AM Dallas Edge [facility]   11/12/2020  8:45 AM Alex Farmer PT [facility]   11/17/2020  8:45 AM Kody Edge [facility]   11/19/2020  8:45 AM Alex Farmer, PT [facility]   11/24/2020 8:45 AM Brittny Duncan, PTA [facility]   1/28/2021  9:00 AM Cherry Heath, NP [facility]

## 2020-10-27 ENCOUNTER — HOSPITAL ENCOUNTER (OUTPATIENT)
Dept: PHYSICAL THERAPY | Age: 75
Discharge: HOME | End: 2020-10-27
Payer: MEDICARE

## 2020-10-27 PROCEDURE — 97530 THERAPEUTIC ACTIVITIES: CPT

## 2020-10-27 PROCEDURE — 97110 THERAPEUTIC EXERCISES: CPT

## 2020-10-27 NOTE — PROGRESS NOTES
PHYSICAL THERAPY - DAILY TREATMENT NOTE    Patient Name: Jenelle Gonzalez. Date: 10/27/2020  : 1945   YES Patient  Verified  Visit #:   +  Insurance: Payor: Frankie Thomas / Plan: [place] MEDICARE PART A & B / Product Type: Medicare /      In time: 107 Out time: 2:10   Total Treatment Time: 63     Medicare/BCBS Time Tracking (below)   Total Timed Codes (min):  63 1:1 Treatment Time:  53     TREATMENT AREA = Pain in left knee [M25.562]    SUBJECTIVE    Pain Level (on 0 to 10 scale):  3  / 10   Medication Changes/New allergies or changes in medical history, any new surgeries or procedures? NO    If yes, update Summary List   Subjective Functional Status/Changes:  []  No changes reported     \"I'm ok in a straight line, but when I turn I feel it. It's not a sharp pain but it's there. Stairs still feel pretty hard. \"          OBJECTIVE     Therapeutic Procedures:  Min Procedure Specifics + Rationale   n/a [x]  Patient Education (performed throughout session) [x] Review HEP    [] Progressed/Changed HEP based on:   [] proper performance and advancement of Therex/TA   [] reduction in pain level    [] increased functional capacity       [] change in directional preference   30(30) [x] Therapeutic Exercise   [x]  See Flowsheet   Rationale: increase ROM and increase strength to improve the patients ability to participate in ADL's    23(23) [x] Therapeutic Activity   [x]  See Flowsheet  Re-assessment  Rationale:  To improve safety, proprioception, coordination, and efficiency with tasks     Modality rationale: decrease inflammation, decrease pain, increase tissue extensibility and increase muscle contraction/control to improve the patients ability to perform ADL's with greater ease       Min Type Additional Details   10 [x]  Cold Pack   [] pre-KAILEY      [x] post-KAILEY  []  Ice massage Location:left knee    [x] supine              [] prone  [x] legs elevated    [] legs flat   [] sitting   [x] Skin assessment post-treatment:  [x]intact [x]redness- no adverse reaction       []redness - adverse reaction:     Other Objective/Functional Measures:    See PN     Post Treatment Pain Level (on 0 to 10) scale:   0  / 10     ASSESSMENT    Assessment/Changes in Function:       See Pn         Patient will continue to benefit from skilled PT services to modify and progress therapeutic interventions, address functional mobility deficits, address ROM deficits, address strength deficits, analyze and address soft tissue restrictions, analyze and cue movement patterns, analyze and modify body mechanics/ergonomics, assess and modify postural abnormalities, address imbalance/dizziness and instruct in home and community integration  to attain remaining goals   Progress toward goals / Updated goals:    See PN     PLAN    [x]  Upgrade activities as tolerated  [x]  Update interventions per flow sheet YES Continue plan of care   []  Discharge due to :    []  Other:      Therapist: Danny Quinonez \"BJ\" Shea Dimas, DPT, Cert. MDT, Cert. DN, Cert. SMT, Dip.  Osteopractic    Date: 10/27/2020 Time: 1:21 PM     Future Appointments   Date Time Provider Jomar Henson   10/29/2020 11:00 AM David Bartholomew, PT [facility]   11/3/2020  8:45 AM David Bartholomew, PT [facility]   11/5/2020  8:45 AM David Bartholomew, PT [facility]   11/10/2020  8:45 AM Leela Hawkins PTA [facility]   11/12/2020  8:45 AM David Bartholomew, PT [facility]   11/17/2020  8:45 AM Vanita Spangler [facility]   11/19/2020  8:45 AM David Bartholomew, PT [facility]   11/24/2020  8:45 AM Leela Hawkins PTA [facility]   1/28/2021  9:00 AM Marcela Heath, NP [facility]

## 2020-10-27 NOTE — PROGRESS NOTES
[facility] PHYSICAL THERAPY  [address] [place] [address], [email or web address] - Phone: [phone]  Fax: [phone]  PROGRESS NOTE  Patient Name: Concetta Marvin. : 1945   Treatment/Medical Diagnosis: Pain in left knee [M25.562]   Referral Source: Berlin Russ MD     Date of Initial Visit: 9/3/2020 Attended Visits: 12 Missed Visits: 0     SUMMARY OF TREATMENT  Patient's POC has consisted of therex, therapeutic activities, manual therapy prn, modalities prn, pt. education, and a comprehensive HEP. Treatment strategies used to address functional mobility deficits, ROM deficits, strength deficits, analyze and address soft tissue restrictions, analyze and cue movement patterns, analyze and modify body mechanics/ergonomics, assess and modify postural abnormalities and instruct in home and community integration. CURRENT STATUS  Patient reports minimal to no pain when walking in sagittal plane. He reports recurrence of discomfort with pivoting/turning, though no longer sharp pain. Goal/Measure of Progress Goal Met? 1. Patient to be Safe and Independent with HEP to self-manage/prevent symptoms after DC. 2. Patient to demonstrate safe stair negotiation in reciprocal pattern to facilitate community mobility and ease in accessing the second floor of his home. 3. Patient to increase FOTO score to > 63 to indicate improved functional independence. 4. Patient to demonstrate left knee AROM flexion to > 110 degrees to facilitate ease in donning/doffing   Ongoing      Partially Met        Ongoing      MET     New Goals to be achieved in __2-4__  weeks:  1. Patient to be Independent with HEP to self-manage/prevent symptoms after DC. 2. Patient to demonstrate ability to negotiate stairs in reciprocal pattern without UE assist in pain free manner to increase ease in community mobility. 3.  Patient to demonstrate pain free pivot turn to reduce risk for falls. Frequency / Duration:   Patient to be seen  2  times per week for 4  weeks:    RECOMMENDATIONS  Progress patient towards independent HEP to facilitate self-management of symptoms and progress gains after PT. If you have any questions/comments please contact us directly at [phone]. Thank you for allowing us to assist in the care of your patient. Therapist Signature: Watson Head \"BJ\" Tiff Valentin DPT, Cert. MDT, Cert. DN, Cert. SMT, Dip. Osteopractic Date: 05/16/2877   Certification Period: 9/3/2020-12/2/2020     Reporting Period 9/22/2020-10/27/2020   Time: 1:22 PM   NOTE TO PHYSICIAN:  PLEASE COMPLETE THE ORDERS BELOW AND FAX TO   [facility]: [phone]. If you are unable to process this request in 24 hours please contact our office: [phone].    ___ I have read the above report and request that my patient continue as recommended.   ___ I have read the above report and request that my patient continue therapy with the following changes/special instructions:_________________________________________________________   ___ I have read the above report and request that my patient be discharged from therapy.      Physician Signature:        Date:       Time:

## 2020-10-29 ENCOUNTER — HOSPITAL ENCOUNTER (OUTPATIENT)
Dept: PHYSICAL THERAPY | Age: 75
Discharge: HOME | End: 2020-10-29
Payer: MEDICARE

## 2020-10-29 PROCEDURE — 97110 THERAPEUTIC EXERCISES: CPT

## 2020-10-29 PROCEDURE — 97530 THERAPEUTIC ACTIVITIES: CPT

## 2020-10-29 NOTE — PROGRESS NOTES
PHYSICAL THERAPY - DAILY TREATMENT NOTE    Patient Name: Ashley Beckett. Date: 10/29/2020  : 1945   YES Patient  Verified  Visit #:   15   of   14+  Insurance: Payor: Ashley May / Plan: [place] MEDICARE PART A & B / Product Type: Medicare /      In time: 11:00 Out time: 12:05   Total Treatment Time: 65     Medicare/BCBS Time Tracking (below)   Total Timed Codes (min):  65 1:1 Treatment Time:  38     TREATMENT AREA = Pain in left knee [M25.562]    SUBJECTIVE    Pain Level (on 0 to 10 scale):  1-2  / 10   Medication Changes/New allergies or changes in medical history, any new surgeries or procedures? NO    If yes, update Summary List   Subjective Functional Status/Changes:  []  No changes reported     \"This weather isn't helping me today. Feels stiff. \"          OBJECTIVE     Therapeutic Procedures:  Min Procedure Specifics + Rationale   n/a [x]  Patient Education (performed throughout session) [x] Review HEP    [] Progressed/Changed HEP based on:   [] proper performance and advancement of Therex/TA   [] reduction in pain level    [] increased functional capacity       [] change in directional preference   35(45) [x] Therapeutic Exercise   [x]  See Flowsheet   Rationale: increase ROM and increase strength to improve the patients ability to participate in ADL's    20(20) [x] Therapeutic Activity   [x]  See Flowsheet  Rationale:  To improve safety, proprioception, coordination, and efficiency with tasks     Modality rationale: decrease inflammation, decrease pain, increase tissue extensibility and increase muscle contraction/control to improve the patients ability to perform ADL's with greater ease       Min Type Additional Details   10 [x]  Cold Pack   [] pre-KAILEY      [x] post-KAILEY  []  Ice massage Location:knee    [] supine              [] prone  [] legs elevated    [] legs flat   [] sitting   [x] Skin assessment post-treatment:  [x]intact [x]redness- no adverse reaction       []redness - adverse reaction:     Other Objective/Functional Measures:    Increased reps/sets/resistance per flow sheet. Added TG       Post Treatment Pain Level (on 0 to 10) scale:   0  / 10     ASSESSMENT    Assessment/Changes in Function:       Improved sit <-->stand performance with application of band         Patient will continue to benefit from skilled PT services to modify and progress therapeutic interventions, address functional mobility deficits, address ROM deficits, address strength deficits, analyze and address soft tissue restrictions, analyze and cue movement patterns, analyze and modify body mechanics/ergonomics, address imbalance/dizziness and instruct in home and community integration  to attain remaining goals   Progress toward goals / Updated goals:    1st session since progress note. No significant progress observed       PLAN    [x]  Upgrade activities as tolerated  [x]  Update interventions per flow sheet YES Continue plan of care   []  Discharge due to :    []  Other:      Therapist: Fernando Allan \"BJ\" Regine, MARY, Cert. MDT, Cert. DN, Cert. SMT, Dip.  Osteopractic    Date: 10/29/2020 Time: 11:18 AM     Future Appointments   Date Time Provider Jomar Henson   11/3/2020  8:45 AM Freddie Paul, PT [facility]   11/5/2020  8:45 AM Freddie Paul PT [facility]   11/10/2020  8:45 AM Bal Warren PTA [facility]   11/12/2020  8:45 AM Freddie Paul PT [facility]   11/17/2020  8:45 AM Jefry Moody [facility]   11/19/2020  8:45 AM Freddie Paul PT [facility]   11/24/2020  8:45 AM CASANDRA WinklerPTNA [facility]   1/28/2021  9:00 AM Rosendo Heath, [facility]

## 2020-11-03 ENCOUNTER — HOSPITAL ENCOUNTER (OUTPATIENT)
Dept: PHYSICAL THERAPY | Age: 75
Discharge: HOME | End: 2020-11-03
Payer: MEDICARE

## 2020-11-03 PROCEDURE — 97112 NEUROMUSCULAR REEDUCATION: CPT

## 2020-11-03 PROCEDURE — 97140 MANUAL THERAPY 1/> REGIONS: CPT

## 2020-11-03 PROCEDURE — 97014 ELECTRIC STIMULATION THERAPY: CPT

## 2020-11-03 PROCEDURE — 97110 THERAPEUTIC EXERCISES: CPT

## 2020-11-03 PROCEDURE — 97530 THERAPEUTIC ACTIVITIES: CPT

## 2020-11-03 NOTE — PROGRESS NOTES
PHYSICAL THERAPY - DAILY TREATMENT NOTE    Patient Name: Jenelle Gonzalez. Date: 11/3/2020  : 1945   YES Patient  Verified  Visit #:     Insurance: Payor: Frankie Thomas / Plan: [place] MEDICARE PART A & B / Product Type: Medicare /      In time: 8:40early Out time: 9:50   Total Treatment Time: 68     Medicare/BCBS Time Tracking (below)   Total Timed Codes (min):  68 1:1 Treatment Time:  53     TREATMENT AREA = Pain in left knee [M25.562]    SUBJECTIVE    Pain Level (on 0 to 10 scale):  0-1  / 10   Medication Changes/New allergies or changes in medical history, any new surgeries or procedures? NO    If yes, update Summary List   Subjective Functional Status/Changes:  []  No changes reported     \"Surpirsingly I'm doing pretty good today despite the cold. \"          OBJECTIVE     Therapeutic Procedures:  Min Procedure Specifics + Rationale   n/a [x]  Patient Education (performed throughout session) [x] Review HEP    [] Progressed/Changed HEP based on:   [] proper performance and advancement of Therex/TA   [] reduction in pain level    [] increased functional capacity       [] change in directional preference   (22) [x] Therapeutic Exercise   [x]  See Flowsheet   Rationale: increase ROM and increase strength to improve the patients ability to participate in ADL's    8 [x]  Manual Therapy       [] IASTM - Tempering Jeet HS/Lateral Thigh/Adductors  [] TDN (see objective; actual needle insertion time not billed)   Rationale: decrease pain, increase ROM, increase tissue extensibility, decrease trigger points and increase postural awareness to attain functional use and participation with ADL's  [x] Skin assessment post-treatment:  [x]intact []redness- no adverse reaction   []redness  adverse   15(15) [x] Therapeutic Activity   [x]  See Flowsheet  Transfers, step ups  Rationale:  To improve safety, proprioception, coordination, and efficiency with tasks   8 [x] Neuromuscular Re-ed   [x]  See Flowsheet  Self Tempering - Quads/HS  Rationale: increase ROM, increase strength, improve coordination, improve balance and increase proprioception  to improve the patients ability to safely participate in ADL's     Modality rationale: decrease inflammation, decrease pain, increase tissue extensibility and increase muscle contraction/control to improve the patients ability to perform ADL's with greater ease       Min Type Additional Details   15 [x] E-Stim Type:Symmetrical Biphasic  Attended? NO Location: left knee  [x] supine              [] prone  [x] legs elevated   [] legs flat  []w/heat  [x]w/ice  [] sitting    [x] Skin assessment post-treatment:  [x]intact [x]redness- no adverse reaction       []redness  adverse reaction:     Other Objective/Functional Measures:  Performed tap downs with VC to push hip back to provide greater hip dominance in the movement as patient is primarily quad dominant with poor strength. Added heel elevated squats to emphasize quad strength       Post Treatment Pain Level (on 0 to 10) scale:   0  / 10     ASSESSMENT    Assessment/Changes in Function:       Per above, quad dominant in movement but limitations in strength and ROM (extension) cause biomechanical emphasis on joint rather than muscles. Patient will continue to benefit from skilled PT services to modify and progress therapeutic interventions, address functional mobility deficits, address ROM deficits, address strength deficits, analyze and address soft tissue restrictions, analyze and cue movement patterns, analyze and modify body mechanics/ergonomics and instruct in home and community integration  to attain remaining goals   Progress toward goals / Updated goals:    Improved static/neutral paiun     PLAN    [x]  Upgrade activities as tolerated  [x]  Update interventions per flow sheet YES Continue plan of care   []  Discharge due to :    []  Other:      Therapist: Beverly Kelley \"BJ\" MARY Weiner, Cert. MDT, Cert. DN, Cert. BEATRIZ, Dip.  Osteopractic    Date: 11/3/2020 Time: 8:59 AM     Future Appointments   Date Time Provider Jomar Henson   11/5/2020  8:45 AM Jessica Ro PT [facility]   11/10/2020  8:45 AM Gagandeep Plata PTA [facility]   11/12/2020  8:45 AM Jessica Ro PT [facility]   11/17/2020  8:45 AM Shefali Cardenas [facility]   11/19/2020  8:45 AM Jessica Ro PT [facility]   11/24/2020  8:45 AM Gagandeep Plata PTA [facility]   1/28/2021  9:00 AM Merritt Heath, [facility]

## 2020-11-05 ENCOUNTER — HOSPITAL ENCOUNTER (OUTPATIENT)
Dept: PHYSICAL THERAPY | Age: 75
Discharge: HOME | End: 2020-11-05
Payer: MEDICARE

## 2020-11-05 PROCEDURE — 97014 ELECTRIC STIMULATION THERAPY: CPT

## 2020-11-05 PROCEDURE — 97140 MANUAL THERAPY 1/> REGIONS: CPT

## 2020-11-05 PROCEDURE — 97110 THERAPEUTIC EXERCISES: CPT

## 2020-11-05 NOTE — PROGRESS NOTES
PHYSICAL THERAPY - DAILY TREATMENT NOTE    Patient Name: Dominique George. Date: 2020  : 1945   YES Patient  Verified  Visit #:   15   of   22  Insurance: Payor: [place] MEDICARE / Plan: [place] MEDICARE PART A & B / Product Type: Medicare /      In time: 8:40 Out time: 9:35   Total Treatment Time: 55     Medicare/BCBS Time Tracking (below)   Total Timed Codes (min):  55 1:1 Treatment Time:  40     TREATMENT AREA = Pain in left knee [M25.562]    SUBJECTIVE    Pain Level (on 0 to 10 scale):  2  / 10   Medication Changes/New allergies or changes in medical history, any new surgeries or procedures? NO    If yes, update Summary List   Subjective Functional Status/Changes:  []  No changes reported     \"Knee is ok, the thigh has been sore. \"          OBJECTIVE     Therapeutic Procedures:  Min Procedure Specifics + Rationale   n/a [x]  Patient Education (performed throughout session) [x] Review HEP    [] Progressed/Changed HEP based on:   [] proper performance and advancement of Therex/TA   [] reduction in pain level    [] increased functional capacity       [] change in directional preference   17(17) [x] Therapeutic Exercise   [x]  See Flowsheet   Rationale: increase ROM and increase strength to improve the patients ability to participate in ADL's    23 [x]  Manual Therapy       [x] IASTM - FRAM/Dynamic cupping to lateral thigh.    [] TDN (see objective; actual needle insertion time not billed)   [x] Involved Knee Patellar G2-G4 mobs inferior/medial, STM to quads/HS/ITB interface, Ant/Post G3-G4 Tibiofemoral glides, PROM flexion/Ext contract/relax    Rationale: decrease pain, increase ROM, increase tissue extensibility, decrease trigger points and increase postural awareness to attain functional use and participation with ADL's  [x] Skin assessment post-treatment:  [x]intact []redness- no adverse reaction   []redness  adverse     Modality rationale: decrease inflammation, decrease pain, increase tissue extensibility and increase muscle contraction/control to improve the patients ability to perform ADL's with greater ease       Min Type Additional Details   15 [x] E-Stim Type:IFC  Attended? NO Location: Left knee/ITB  [] supine              [] prone  [x] legs elevated   [] legs flat  []w/heat  [x]w/ice  [] sitting    [x] Skin assessment post-treatment:  [x]intact [x]redness- no adverse reaction       []redness  adverse reaction:     Other Objective/Functional Measures:    Performed manual after warm up. Responded very well to FRAM/Dynamic cupping. Regressed therex due to recent ITB exacerbation  Difficulty initially activating glutes for bridging, improved after performance of H/L abduction and clams. Post Treatment Pain Level (on 0 to 10) scale:   0  / 10     ASSESSMENT    Assessment/Changes in Function:       Left gluteal strength lacking         Patient will continue to benefit from skilled PT services to modify and progress therapeutic interventions, address functional mobility deficits, address ROM deficits, address strength deficits, analyze and address soft tissue restrictions, analyze and cue movement patterns, analyze and modify body mechanics/ergonomics and instruct in home and community integration  to attain remaining goals   Progress toward goals / Updated goals:    Able to alleviate pain despite recent exacerbation     PLAN    [x]  Upgrade activities as tolerated  [x]  Update interventions per flow sheet YES Continue plan of care   []  Discharge due to :    []  Other:      Therapist: Mark Anthony Berger \"BJ\" Tristian Crawford, DPT, Cert. MDT, Cert. DN, Cert. SMT, Dip.  Osteopractic    Date: 11/5/2020 Time: 8:51 AM     Future Appointments   Date Time Provider Jomar Henson   11/10/2020  8:45 AM [place] [id number]   11/12/2020  8:45 AM Jayant Kingsley, PT [place] [id number] [address]   11/17/2020  8:45 AM [place] [id number] [address]   11/19/2020  8:45 AM Jayant Kingsley, PT [place] [id number] [address]   11/24/2020  8:45 AM Svetlana Schmidt, PTA [facility]   1/28/2021  9:00 AM [facility]

## 2020-11-10 ENCOUNTER — HOSPITAL ENCOUNTER (OUTPATIENT)
Dept: PHYSICAL THERAPY | Age: 75
Discharge: HOME | End: 2020-11-10
Payer: MEDICARE

## 2020-11-10 PROCEDURE — 97530 THERAPEUTIC ACTIVITIES: CPT

## 2020-11-10 PROCEDURE — 97110 THERAPEUTIC EXERCISES: CPT

## 2020-11-10 NOTE — PROGRESS NOTES
PHYSICAL THERAPY - DAILY TREATMENT NOTE    Patient Name: Roel Friedman. Date: 11/10/2020  : 1945   yes Patient  Verified  Visit #:     Insurance: Payor: Pia Prince / Plan: [place] MED[place]ARE PART A & B / Product Type: Medicare /      In time:  701 Out time: 444   Total Treatment Time: 73     Medicare/BCBS Time Tracking (below)   Total Timed Codes (min):  63 1:1 Treatment Time:  63     TREATMENT AREA =  Pain in left knee [M25.562]    SUBJECTIVE  Pain Level (on 0 to 10 scale):   0 / 10   Medication Changes/New allergies or changes in medical history, any new surgeries or procedures?    no  If yes, update Summary List   Subjective Functional Status/Changes:  []  No changes reported     \"Just the usual ach, its not as swollen so that is good! \"          OBJECTIVE  Modalities Rationale:     decrease inflammation and decrease pain to improve patient's ability to  safely perform ADLs/transfers/squatting/prolong stding and ambulation/stair negotiation with minimal or no c/o pain     min [] Estim, type/location:                                      []  att     []  unatt     []  w/US     []  w/ice    []  w/heat    min []  Mechanical Traction: type/lbs                   []  pro   []  sup   []  int   []  cont    []  before manual    []  after manual    min []  Ultrasound, settings/location:      min []  Iontophoresis w/ dexamethasone, location:                                               []  take home patch       []  in clinic   10 min [x]  Ice     []  Heat    location/position: Supine with wedge under B LEs    min []  Vasopneumatic Device, press/temp:     min []  Other:    [x] Skin assessment post-treatment (if applicable):    [x]  intact    []  redness- no adverse reaction     []redness  adverse reaction:          33 min Therapeutic Exercise:  [x]  See flow sheet   Rationale:      increase ROM, increase strength, improve coordination, improve balance and increase proprioception to improve the patients ability to  safely perform ADLs/transfers/squatting/prolong stding and ambulation/stair negotiation with minimal or no c/o pain\      30 min Therapeutic Activity: step ups on bosu  side stepping   HRs for push off and TRs for ankle HS during gait  SLS   balance on AE   Rationale:    increase ROM, increase strength and improve coordination to improve the patients ability to safely perform ADLs/ bending/stooping/ lifting/prolong sitting, stding and amb/ stairs with minimal to no pain          Billed With/As:   [x] TE   [x] TA   [] Neuro   [] Self Care Patient Education: [x] Review HEP    [] Progressed/Changed HEP based on:   [x] positioning   [x] body mechanics   [x] transfers   [x] heat/ice application    [x] other: Pt ed on importance and benefits of compliance with HEP, core strength/stability and proper posture; pt verbalized understanding  issued OTB for lateral amb to perform with HEP     Other Objective/Functional Measures:  VCs + demo to perform proper technique for TE    Initiated step ups + knee drive on BOSU without c/o p!  1 UE asssit required     increased to 8\" with knee flex to end range without difficulty    Post Treatment Pain Level (on 0 to 10) scale:  0  / 10     ASSESSMENT  Assessment/Changes in Function:   Progressed there-ex without c/o increase p!  initiated step ups on bosu with 1 UE assist, progressing towards LTG # 2  no UE assist with SLS, no LOB noted    []  See Progress Note/Recertification   Patient will continue to benefit from skilled PT services to modify and progress therapeutic interventions, address functional mobility deficits, address ROM deficits, address strength deficits, analyze and address soft tissue restrictions, analyze and cue movement patterns, analyze and modify body mechanics/ergonomics, assess and modify postural abnormalities and instruct in home and community integration to attain remaining goals.    Progress toward goals / Updated goals:  New Goals to be achieved in __2-4__  weeks from 10/27/2020:  1. Patient to be Independent with HEP to self-manage/prevent symptoms after DC. 2. Patient to demonstrate ability to negotiate stairs in reciprocal pattern without UE assist in pain free manner to increase ease in community mobility. 3.  Patient to demonstrate pain free pivot turn to reduce risk for falls.       PLAN  [x]  Upgrade activities as tolerated no Continue plan of care   []  Discharge due to :    []  Other:      Therapist: Brittany Rosales PTA    Date: 11/10/2020 Time: 10:42 AM     Future Appointments   Date Time Provider Jomar Henson   11/12/2020  8:45 AM America Murray, REILLY [facility]   11/17/2020  8:45 AM Ponce Higuera PTA [facility]   11/19/2020  8:45 AM America Murray PT [facility]   11/24/2020  8:45 AM [facility]   1/28/2021  9:00 AM Osiris Heath, [facility]

## 2020-11-12 ENCOUNTER — HOSPITAL ENCOUNTER (OUTPATIENT)
Dept: PHYSICAL THERAPY | Age: 75
Discharge: HOME | End: 2020-11-12
Payer: MEDICARE

## 2020-11-12 PROCEDURE — 97110 THERAPEUTIC EXERCISES: CPT

## 2020-11-12 PROCEDURE — 97530 THERAPEUTIC ACTIVITIES: CPT

## 2020-11-12 NOTE — PROGRESS NOTES
PHYSICAL THERAPY - DAILY TREATMENT NOTE    Patient Name: Dedra Sahni. Date: 2020  : 1945   YES Patient  Verified  Visit #:     Insurance: Payor: [place] MEDICARE / Plan: [place] MEDICARE PART A & B / Product Type: Medicare /      In time: 8:40 Out time: 9:33   Total Treatment Time: 53     Medicare/BCBS Time Tracking (below)   Total Timed Codes (min):  53 1:1 Treatment Time:53       TREATMENT AREA = Pain in left knee [M25.562]    SUBJECTIVE    Pain Level (on 0 to 10 scale):  0  / 10   Medication Changes/New allergies or changes in medical history, any new surgeries or procedures? NO    If yes, update Summary List   Subjective Functional Status/Changes:  []  No changes reported     \"The swelling is down but hurts here (quadriceps tendon)\"  \"I've got to leave early today if you don't mind. OBJECTIVE     Therapeutic Procedures:  Min Procedure Specifics + Rationale   n/a [x]  Patient Education (performed throughout session) [x] Review HEP    [] Progressed/Changed HEP based on:   [] proper performance and advancement of Therex/TA   [] reduction in pain level    [] increased functional capacity       [] change in directional preference   38(38) [x] Therapeutic Exercise   [x]  See Flowsheet   Rationale: increase ROM and increase strength to improve the patients ability to participate in ADL's    15(15) [x] Therapeutic Activity   [x]  See Flowsheet    Rationale: To improve safety, proprioception, coordination, and efficiency with tasks         Other Objective/Functional Measures:    During 2nd set of BOSU step ups patient experienced transient catching sensation to anterolateral aspect of knee.  Able to      Post Treatment Pain Level (on 0 to 10) scale:   0-1  / 10     ASSESSMENT    Assessment/Changes in Function:     Performed/participated in treatment well without complaints aside from muscular fatigue/deconditioning, indicating (+) response to current course of treatment to further improve functional status. Patient will continue to benefit from skilled PT services to modify and progress therapeutic interventions, address functional mobility deficits, address ROM deficits, address strength deficits, analyze and address soft tissue restrictions, analyze and cue movement patterns, analyze and modify body mechanics/ergonomics and instruct in home and community integration  to attain remaining goals   Progress toward goals / Updated goals:    Progressing in strength and balance. PLAN    [x]  Upgrade activities as tolerated  [x]  Update interventions per flow sheet YES Continue plan of care   []  Discharge due to :    []  Other:      Therapist: Varsha Hollingsworth \"BJ\" Regine, MARY, Cert. MDT, Cert. DN, Cert. SMT, Dip.  Osteopractic    Date: 11/12/2020 Time: 8:49 AM     Future Appointments   Date Time Provider Jomar Henson   11/17/2020  8:45 AM Franci Rodriguez [facility]   11/19/2020  8:45 AM Dejon Alcocer, PT [facility]   11/24/2020  8:45 AM Tabitha Myers PTA [facility]   1/28/2021  9:00 AM Anay Heath, [facility]

## 2020-11-17 ENCOUNTER — HOSPITAL ENCOUNTER (OUTPATIENT)
Dept: PHYSICAL THERAPY | Age: 75
Discharge: HOME | End: 2020-11-17
Payer: MEDICARE

## 2020-11-17 PROCEDURE — 97530 THERAPEUTIC ACTIVITIES: CPT

## 2020-11-17 PROCEDURE — 97110 THERAPEUTIC EXERCISES: CPT

## 2020-11-17 NOTE — PROGRESS NOTES
PHYSICAL THERAPY - DAILY TREATMENT NOTE    Patient Name: Shweta Norris. Date: 2020  : 1945   yes Patient  Verified  Visit #:     Insurance: Payor: VA MEDICARE / Plan: VA MEDICARE PART A & B / Product Type: Medicare /      In time:  269 Out time: 262   Total Treatment Time: 59     Medicare/BCBS Time Tracking (below)   Total Timed Codes (min): 49 1:1 Treatment Time:  49     TREATMENT AREA =  Pain in left knee [M25.562]    SUBJECTIVE  Pain Level (on 0 to 10 scale):   0 / 10   Medication Changes/New allergies or changes in medical history, any new surgeries or procedures?    no  If yes, update Summary List   Subjective Functional Status/Changes:  []  No changes reported     \"it was so tight over the weekend after I had therapy last time.  I road my bike and it was better\"          OBJECTIVE  Modalities Rationale:     decrease inflammation and decrease pain to improve patient's ability to  safely perform ADLs/transfers/squatting/prolong stding and ambulation/stair negotiation with minimal or no c/o pain     min [] Estim, type/location:                                      []  att     []  unatt     []  w/US     []  w/ice    []  w/heat    min []  Mechanical Traction: type/lbs                   []  pro   []  sup   []  int   []  cont    []  before manual    []  after manual    min []  Ultrasound, settings/location:      min []  Iontophoresis w/ dexamethasone, location:                                               []  take home patch       []  in clinic   10 min [x]  Ice     []  Heat    location/position: Supine with wedge under B LEs    min []  Vasopneumatic Device, press/temp:     min []  Other:    [x] Skin assessment post-treatment (if applicable):    [x]  intact    []  redness- no adverse reaction     []redness  adverse reaction:          21 min Therapeutic Exercise:  [x]  See flow sheet   Rationale:      increase ROM, increase strength, improve coordination, improve balance and increase proprioception to improve the patients ability to  safely perform ADLs/transfers/squatting/prolong stding and ambulation/stair negotiation with minimal or no c/o pain\      28 min Therapeutic Activity: step ups on bosu  step downs  side stepping   HRs for push off and TRs for ankle HS during gait  SLS    Rationale:    increase ROM, increase strength and improve coordination to improve the patients ability to safely perform ADLs/ bending/stooping/ lifting/prolong sitting, stding and amb/ stairs with minimal to no pain          Billed With/As:   [x] TE   [x] TA   [] Neuro   [] Self Care Patient Education: [x] Review HEP    [] Progressed/Changed HEP based on:   [x] positioning   [x] body mechanics   [x] transfers   [x] heat/ice application    [x] other: Pt ed on importance and benefits of compliance with HEP, core strength/stability and proper posture; pt verbalized understanding     Other Objective/Functional Measures:  VCs + demo to perform proper technique for TE    SLS x 30'' without LOB    demos trunk flex with knee flex and lateral tap downs to compensate, improved with VCs     Post Treatment Pain Level (on 0 to 10) scale:  0/ 10     ASSESSMENT  Assessment/Changes in Function:   Progressed there-ex without c/o increase p!  attempted knee flex to end range on 8'' with 2#, pt unable due to decrease strengh   []  See Progress Note/Recertification   Patient will continue to benefit from skilled PT services to modify and progress therapeutic interventions, address functional mobility deficits, address ROM deficits, address strength deficits, analyze and address soft tissue restrictions, analyze and cue movement patterns, analyze and modify body mechanics/ergonomics, assess and modify postural abnormalities and instruct in home and community integration to attain remaining goals. Progress toward goals / Updated goals:  New Goals to be achieved in __2-4__  weeks from 10/27/2020:  1.  Patient to be Independent with HEP to self-manage/prevent symptoms after DC. 2. Patient to demonstrate ability to negotiate stairs in reciprocal pattern without UE assist in pain free manner to increase ease in community mobility. progressing, performing step ups on bosu and step downs on 6'' without difficulty  3. Patient to demonstrate pain free pivot turn to reduce risk for falls.       PLAN  [x]  Upgrade activities as tolerated no Continue plan of care   []  Discharge due to :    []  Other:      Therapist: Linda Murrell PTA    Date: 11/17/2020 Time: 10:42 AM     Future Appointments   Date Time Provider Jomar Henson   11/19/2020  8:45 AM Leana Bautista, PT [facility]   11/24/2020  8:45 AM [facility]   1/28/2021  9:00 AM Milton Heath, NP [facility]

## 2020-11-19 ENCOUNTER — HOSPITAL ENCOUNTER (OUTPATIENT)
Dept: PHYSICAL THERAPY | Age: 75
Discharge: HOME | End: 2020-11-19
Payer: MEDICARE

## 2020-11-19 PROCEDURE — 97110 THERAPEUTIC EXERCISES: CPT

## 2020-11-19 PROCEDURE — 97016 VASOPNEUMATIC DEVICE THERAPY: CPT

## 2020-11-19 PROCEDURE — 97530 THERAPEUTIC ACTIVITIES: CPT

## 2020-11-19 NOTE — PROGRESS NOTES
PHYSICAL THERAPY - DAILY TREATMENT NOTE    Patient Name: Carla Horner. Date: 2020  : 1945   YES Patient  Verified  Visit #:     Insurance: Payor: [place] MEDICARE / Plan: VA MEDICARE PART A & B / Product Type: Medicare /      In time: 8:35 Out time: 9:35   Total Treatment Time: 60     Medicare/BCBS Time Tracking (below)   Total Timed Codes (min):  60 1:1 Treatment Time:  45     TREATMENT AREA = Pain in left knee [M25.562]    SUBJECTIVE    Pain Level (on 0 to 10 scale): \"Tight\"  / 10   Medication Changes/New allergies or changes in medical history, any new surgeries or procedures? NO    If yes, update Summary List   Subjective Functional Status/Changes:  []  No changes reported     \"I really tried to stretch it yesterday and I think I over stretched it. It felt so good afterwards and I felt better walking around. But this morning you can see it's gotten pretty swollen again. \"          OBJECTIVE     Therapeutic Procedures:  Min Procedure Specifics + Rationale   n/a [x]  Patient Education (performed throughout session) [x] Review HEP    [] Progressed/Changed HEP based on:   [] proper performance and advancement of Therex/TA   [] reduction in pain level    [] increased functional capacity       [] change in directional preference   30 [x] Therapeutic Exercise   [x]  See Flowsheet   Rationale: increase ROM and increase strength to improve the patients ability to participate in ADL's    15 [x] Therapeutic Activity   [x]  See Flowsheet  Rationale:  To improve safety, proprioception, coordination, and efficiency with tasks     Modality rationale: decrease inflammation, decrease pain, increase tissue extensibility and increase muscle contraction/control to improve the patients ability to perform ADL's with greater ease       Min Type Additional Details   10 [x]  vasopneumatic  [] pre-KAILEY      [x] post-KAILEY  []  Ice massage Location:left knee    [] supine              [] prone  [] legs elevated    [] legs flat   [] sitting   [x] Skin assessment post-treatment:  [x]intact [x]redness- no adverse reaction       []redness  adverse reaction:     Other Objective/Functional Measures:    Noted difficulty equalizing weight with squatting activities. Utilized game ready after therex due to swelling. Circumference at patella 44cm, 4\" above 45cm pre GR. Post GR, swelling presented at  Patella = 44cm, 4\" above patella = 4cm. Post Treatment Pain Level (on 0 to 10) scale:   0  / 10     ASSESSMENT    Assessment/Changes in Function:       Pain and discomfort alleviated with extension bias therex. Patient will continue to benefit from skilled PT services to modify and progress therapeutic interventions, address functional mobility deficits, address ROM deficits, address strength deficits, analyze and address soft tissue restrictions, analyze and cue movement patterns, analyze and modify body mechanics/ergonomics and instruct in home and community integration  to attain remaining goals   Progress toward goals / Updated goals:    Approaching plateau. Will finish remaining sessions up to end of certification period     PLAN    [x]  Upgrade activities as tolerated  [x]  Update interventions per flow sheet YES Continue plan of care   []  Discharge due to :    []  Other:      Therapist: Harriet Krueger \"BJ\" MARY Weiner, Cert. MDT, Cert. DN, Cert. SMT, Dip.  Osteopractic    Date: 11/19/2020 Time: 8:49 AM     Future Appointments   Date Time Provider Jomar Henson   11/24/2020  8:45 AM Fede Durham PTA [facility]   1/28/2021  9:00 AM Harinder Heath NP [facility]

## 2020-11-24 ENCOUNTER — APPOINTMENT (OUTPATIENT)
Dept: PHYSICAL THERAPY | Age: 75
End: 2020-11-24
Payer: MEDICARE

## 2020-12-01 ENCOUNTER — HOSPITAL ENCOUNTER (OUTPATIENT)
Dept: PHYSICAL THERAPY | Age: 75
Discharge: HOME | End: 2020-12-01
Payer: MEDICARE

## 2020-12-01 PROCEDURE — 97530 THERAPEUTIC ACTIVITIES: CPT

## 2020-12-01 PROCEDURE — 97110 THERAPEUTIC EXERCISES: CPT

## 2020-12-01 NOTE — PROGRESS NOTES
PHYSICAL THERAPY - DAILY TREATMENT NOTE    Patient Name: Huseyin Degroot.         Date: 2020  : 1945   yes Patient  Verified  Visit #:     Insurance: Payor: Patricia Wheeler / Plan: VA MEDICARE PART A & B / Product Type: Medicare /      In time:  830 Out time: 940   Total Treatment Time: 55     Medicare/BCBS Time Tracking (below)   Total Timed Codes (min): 45 1:1 Treatment Time:  45     TREATMENT AREA =  Pain in left knee [M25.562]    SUBJECTIVE  Pain Level (on 0 to 10 scale):   0 / 10   Medication Changes/New allergies or changes in medical history, any new surgeries or procedures?    no  If yes, update Summary List   Subjective Functional Status/Changes:  []  No changes reported     \"I still sometimes as the day goes on have to go down the stairs side ways bc my knee\"   Pt reports he needs B hands to negotiate steps for safety   pt reports max p! 5/10 and min p! 0/10     OBJECTIVE  Modalities Rationale:     decrease inflammation and decrease pain to improve patient's ability to  safely perform ADLs/transfers/squatting/prolong stding and ambulation/stair negotiation with minimal or no c/o pain     min [] Estim, type/location:                                      []  att     []  unatt     []  w/US     []  w/ice    []  w/heat    min []  Mechanical Traction: type/lbs                   []  pro   []  sup   []  int   []  cont    []  before manual    []  after manual    min []  Ultrasound, settings/location:      min []  Iontophoresis w/ dexamethasone, location:                                               []  take home patch       []  in clinic    min []  Ice     []  Heat    location/position: Supine with wedge under B LEs with heel prop for knee ext   10 min [x]  Vasopneumatic Device, press/temp: med pressure at coldest temp    min []  Other:    [x] Skin assessment post-treatment (if applicable):    [x]  intact    []  redness- no adverse reaction     []redness  adverse reaction:          25 min Therapeutic Exercise:  [x]  See flow sheet   Rationale:      increase ROM, increase strength, improve coordination, improve balance and increase proprioception to improve the patients ability to  safely perform ADLs/transfers/squatting/prolong stding and ambulation/stair negotiation with minimal or no c/o pain\      20 min Therapeutic Activity: stair negotiation  HRs for push off   SLS    Rationale:    increase ROM, increase strength and improve coordination to improve the patients ability to safely perform ADLs/ bending/stooping/ lifting/prolong sitting, stding and amb/ stairs with minimal to no pain      Billed With/As:   [x] TE   [x] TA   [] Neuro   [] Self Care Patient Education: [x] Review HEP    [] Progressed/Changed HEP based on:   [x] positioning   [x] body mechanics   [x] transfers   [x] heat/ice application    [x] other: Pt ed on importance and benefits of compliance with HEP, core strength/stability and proper posture; pt verbalized understanding     Other Objective/Functional Measures:  VCs + demo to perform proper technique for TE    SLS x 30'' without LOB  initiated mini squats to ~ 45 degs with min p!   3/10 p! negotiating 4 steps x 2 with no UE assist, decrease p! with B UE assist   Post Treatment Pain Level (on 0 to 10) scale:  0/ 10     ASSESSMENT  Assessment/Changes in Function:   See PN     []  See Progress Note/Recertification   Patient will continue to benefit from skilled PT services to modify and progress therapeutic interventions, address functional mobility deficits, address ROM deficits, address strength deficits, analyze and address soft tissue restrictions, analyze and cue movement patterns, analyze and modify body mechanics/ergonomics, assess and modify postural abnormalities and instruct in home and community integration to attain remaining goals. Progress toward goals / Updated goals:  New Goals to be achieved in __2-4__  weeks from 10/27/2020:  1.  Patient to be Independent with HEP to self-manage/prevent symptoms after DC. 2. Patient to demonstrate ability to negotiate stairs in reciprocal pattern without UE assist in pain free manner to increase ease in community mobility. progressing, 3/10 p! negotiating 4 steps x 2 with no UE assist, decrease p! with B UE assist  3. Patient to demonstrate pain free pivot turn to reduce risk for falls.  progressing, 3/10 p! with pivot and turn     PLAN  [x]  Upgrade activities as tolerated no Continue plan of care   []  Discharge due to :    []  Other:      Therapist: Patricia Aguayo PTA    Date: 12/1/2020 Time: 10:42 AM     Future Appointments   Date Time Provider Jomar Henson   12/3/2020  9:30 AM Jessica Sanderson, PT [facility]   12/8/2020  8:45 AM Gavin Mar PTA [facility]   12/11/2020  8:45 AM Anastacia Sheth [facility]   12/15/2020  8:45 AM Jessica Sanderson, PT [facility]   12/17/2020 10:15 AM Jessica Sanderson, PT [facility]   1/28/2021  9:00 AM Danae Heath, [facility]

## 2020-12-01 NOTE — PROGRESS NOTES
[address] [facility] PHYSICAL THERAPY  [address] [facility] [address] [employer], [address] [employer] [address] - Phone: [phone]  Fax: [phone] Adriana0 MAYNOR Newman          Patient Name: Nguyen Montalvo. : 1945   Treatment/Medical Diagnosis: Pain in left knee [M25.562]   Onset Date: ~8 months ago    Referral Source: Roel Granados MD Start of [facility]): 9/3/2020   Prior Hospitalization: See Medical History Provider #: [id number]   Prior Level of Function: Recreationally active   Comorbidities: Recent hx of right knee baker's cyst, hx kidney stones   Medications: Verified on Patient Summary List   Visits from [facility]: 20 Missed Visits: 1     Goal/Measure of Progress Goal Met? 1. Patient to be Independent with HEP to self-manage/prevent symptoms after DC. Status at last Eval: Established HEP Current Status: compliant with HEP progressing   2. Patient to demonstrate ability to negotiate stairs in reciprocal pattern without UE assist in pain free manner to increase ease in community mobility.    Status at last Eval: unable Current Status: see below progressing   3.   Patient to demonstrate pain free pivot turn to reduce risk for falls.    Status at last Eval: unable Current Status: 3/10 p! progressing     Key Functional Changes/Progress: Francesco Marmolejo is progressing towards goals in PT for Left knee pain and LLE radic sxs as evidence by progressing towards (I) with HEP, and improving pain with pivoting and stair negotiation. Pt reports he is able to cycle ~8 miles without increase in pain. Pt demos quad control and 3/10 p! level negotiating up and down 4 steps x 2 with B UE assist and x 2 without UE assist safely with reciprocal pattern. Pt reports at end of the day he sometimes has to negotiate laterally descending steps due to decrease Left knee strength and \"catching\". Pt reports max p! 5/10 and min p! 0/10.  Dar Esqueda is (I) with sit <>std without UE and performing mini squats. Pt demos SLS x 30'' without LOB, inidcating decrease fall risk. Problem List: pain affecting function, decrease ROM, decrease strength, edema affecting function, impaired gait/ balance, decrease ADL/ functional abilitiies, decrease activity tolerance, decrease flexibility/ joint mobility and decrease transfer abilities   Treatment Plan may include any combination of the following: Therapeutic exercise, Therapeutic activities, Neuromuscular re-education, Physical agent/modality, Gait/balance training, Manual therapy, Patient education, Self Care training, Functional mobility training, Home safety training and Stair training  Patient Goal(s) has been updated and includes:      Goals for this certification period include and are to be achieved in   2-4  weeks:  1. Patient to be Independent with HEP to self-manage/prevent symptoms after DC. 2. Patient to demonstrate ability to negotiate stairs in reciprocal pattern without UE assist in pain free manner to increase ease in community mobility.   3.  Patient to demonstrate pain free pivot turn to reduce risk for falls. Frequency / Duration:   Patient to be seen   2-3   times per week for   2-4    weeks:    Assessments/Recommendations: Pt will benefit from further skilled PT services to increase strength/stability and decrease sxs to promote functional mobility. If you have any questions/comments please contact us directly at [phone]. Thank you for allowing us to assist in the care of your patient. Therapist Signature: CASANDRA Byrd \"GERARDO\" Anni Figueroa, MARY, Cert. MDT, Cert. DN, Cert. SMT, Dip. Osteopractic Date: 55/2/5929   Certification Period:  Reporting Period: 12/2/2020-1/2/2021  10/27/2020-12/1/2020 Time: 11:21 AM   NOTE TO PHYSICIAN:  PLEASE COMPLETE THE ORDERS BELOW AND FAX TO   [facility] Physical Therapy: [phone].   If you are unable to process this request in 24 hours please contact our office: [phone].    ___ I have read the above report and request that my patient continue as recommended.   ___ I have read the above report and request that my patient continue therapy with the following changes/special instructions: ________________________________________________   ___ I have read the above report and request that my patient be discharged from therapy.      Physician Signature:        Date:       Time:

## 2020-12-03 ENCOUNTER — HOSPITAL ENCOUNTER (OUTPATIENT)
Dept: PHYSICAL THERAPY | Age: 75
Discharge: HOME | End: 2020-12-03
Payer: MEDICARE

## 2020-12-03 PROCEDURE — 97530 THERAPEUTIC ACTIVITIES: CPT

## 2020-12-03 PROCEDURE — 97016 VASOPNEUMATIC DEVICE THERAPY: CPT

## 2020-12-03 PROCEDURE — 97110 THERAPEUTIC EXERCISES: CPT

## 2020-12-03 NOTE — PROGRESS NOTES
PHYSICAL THERAPY - DAILY TREATMENT NOTE    Patient Name: King Kishor. Date: 12/3/2020  : 1945   YES Patient  Verified  Visit #:     Insurance: Payor: [place] MED[place]ARE / Plan: [place] MEDICARE PART A & B / Product Type: Medicare /      In time: 9:23 Out time: 10:20   Total Treatment Time: 57     Medicare/BCBS Time Tracking (below)   Total Timed Codes (min):  57 1:1 Treatment Time:  42     TREATMENT AREA = Pain in left knee [M25.562]    SUBJECTIVE    Pain Level (on 0 to 10 scale):  0-1  / 10   Medication Changes/New allergies or changes in medical history, any new surgeries or procedures? NO    If yes, update Summary List   Subjective Functional Status/Changes:  []  No changes reported     \"I still have some of the swelling. I wasn't having any problems walking up and down the stairs in here, but the other day I had to go up and down about 15 times at my stairs because I was doing laundry. By the end of the day it was really hurting. \"          OBJECTIVE     Therapeutic Procedures:  Min Procedure Specifics + Rationale   n/a [x]  Patient Education (performed throughout session) [x] Review HEP    [] Progressed/Changed HEP based on:   [] proper performance and advancement of Therex/TA   [] reduction in pain level    [] increased functional capacity       [] change in directional preference   32(32) [x] Therapeutic Exercise   [x]  See Flowsheet   Rationale: increase ROM and increase strength to improve the patients ability to participate in ADL's    10(10) [x] Therapeutic Activity   [x]  See Flowsheet  Rationale:  To improve safety, proprioception, coordination, and efficiency with tasks     Modality rationale: decrease inflammation, decrease pain, increase tissue extensibility and increase muscle contraction/control to improve the patients ability to perform ADL's with greater ease       Min Type Additional Details   10 [x] Vasopneumatic  [] pre-KAILEY      [x] post-KAILEY  []  Ice massage Location:Left Knee    [] supine              [] prone  [] legs elevated    [] legs flat   [] sitting   [x] Skin assessment post-treatment:  [x]intact [x]redness- no adverse reaction       []redness  adverse reaction:     Other Objective/Functional Measures:    Patient continues to favor left LE with bilateral therex like mini squats. therex revised per flow sheet   Post Treatment Pain Level (on 0 to 10) scale:   0  / 10     ASSESSMENT    Assessment/Changes in Function:       Tolerated treatment well without complaints of progression, indicating improved functional capacity for ADL's. Patient will continue to benefit from skilled PT services to address ROM deficits and address strength deficits  to attain remaining goals   Progress toward goals / Updated goals:    1st session since progress note. No significant progress observed        PLAN    [x]  Upgrade activities as tolerated  [x]  Update interventions per flow sheet YES Continue plan of care   []  Discharge due to :    []  Other:      Therapist: Marisa Stapleton \"BJ\" MARY Weiner, Cert. MDT, Cert. DN, Cert. SMT, Dip.  Osteopractic    Date: 12/3/2020 Time: 9:25 AM     Future Appointments   Date Time Provider Jomar Henson   12/3/2020  9:30 AM Efren Cummings, PT [facility]   12/8/2020  8:45 AM Kayleigh Templeton PTA [facility]   12/11/2020  8:45 AM Wisam Howard [facility]   12/15/2020  8:45 AM Efrne Cummings PT [facility]   12/17/2020 10:15 AM [facility]   1/28/2021  9:00 AM Hossein Heath, NP [facility]

## 2020-12-08 ENCOUNTER — HOSPITAL ENCOUNTER (OUTPATIENT)
Dept: PHYSICAL THERAPY | Age: 75
Discharge: HOME | End: 2020-12-08
Payer: MEDICARE

## 2020-12-08 PROCEDURE — 97110 THERAPEUTIC EXERCISES: CPT

## 2020-12-08 PROCEDURE — 97530 THERAPEUTIC ACTIVITIES: CPT

## 2020-12-08 NOTE — PROGRESS NOTES
PHYSICAL THERAPY - DAILY TREATMENT NOTE    Patient Name: Rhoda Montgomery.         Date: 2020  : 1945   yes Patient  Verified  Visit #:     Insurance: Payor: David Sena / Plan: [place] MEDICARE PART A & B / Product Type: Medicare /      In time:  496 Out time: 883   Total Treatment Time: 68     Medicare/BCBS Time Tracking (below)   Total Timed Codes (min): 58 1:1 Treatment Time:  50     TREATMENT AREA =  Pain in left knee [M25.562]    SUBJECTIVE  Pain Level (on 0 to 10 scale):   0 / 10   Medication Changes/New allergies or changes in medical history, any new surgeries or procedures?    no  If yes, update Summary List   Subjective Functional Status/Changes:  []  No changes reported     \"I feel it catching sometimes but its not as much\"     OBJECTIVE  Modalities Rationale:     decrease inflammation and decrease pain to improve patient's ability to  safely perform ADLs/transfers/squatting/prolong stding and ambulation/stair negotiation with minimal or no c/o pain     min [] Estim, type/location:                                      []  att     []  unatt     []  w/US     []  w/ice    []  w/heat    min []  Mechanical Traction: type/lbs                   []  pro   []  sup   []  int   []  cont    []  before manual    []  after manual    min []  Ultrasound, settings/location:      min []  Iontophoresis w/ dexamethasone, location:                                               []  take home patch       []  in clinic    min []  Ice     []  Heat    location/position: Supine with wedge under B LEs with heel prop for knee ext   10 min [x]  Vasopneumatic Device, press/temp: med pressure at coldest temp    min []  Other:    [x] Skin assessment post-treatment (if applicable):    [x]  intact    []  redness- no adverse reaction     []redness  adverse reaction:           28 min Therapeutic Exercise:  [x]  See flow sheet   Rationale:      increase ROM, increase strength, improve coordination, improve balance and increase proprioception to improve the patients ability to  safely perform ADLs/transfers/squatting/prolong stding and ambulation/stair negotiation with minimal or no c/o pain\      30 min Therapeutic Activity: bosu step ups  step downs on 6''  HRs for push off   SLS   stap taps   Rationale:    increase ROM, increase strength and improve coordination to improve the patients ability to safely perform ADLs/ bending/stooping/ lifting/prolong sitting, stding and amb/ stairs with minimal to no pain      Billed With/As:   [x] TE   [x] TA   [] Neuro   [] Self Care Patient Education: [x] Review HEP    [] Progressed/Changed HEP based on:   [x] positioning   [x] body mechanics   [x] transfers   [x] heat/ice application    [x] other: Pt ed on importance and benefits of compliance with HEP, core strength/stability and proper posture; pt verbalized understanding     Other Objective/Functional Measures:  VCs + demo to perform proper technique for TE    SLS x 30'' on AE without LOB    1 UE assist with bosu step ups    added 10# to mini squats    increased to 3# with LAQs without difficulty or pain    performed star tap on AE with 1 UE assist, c/o fatigue after completing     pt demos significant decrease in LLE WBing performing mini squats.  Pt instructed to perform to mat to promote proper mechanics due to increase anterior translation   Post Treatment Pain Level (on 0 to 10) scale:  0/ 10     ASSESSMENT  Assessment/Changes in Function:   Progressed there-ex without c/o increase p!  performed mini squats in mirror to help promote LLE Wbing    []  See Progress Note/Recertification   Patient will continue to benefit from skilled PT services to modify and progress therapeutic interventions, address functional mobility deficits, address ROM deficits, address strength deficits, analyze and address soft tissue restrictions, analyze and cue movement patterns, analyze and modify body mechanics/ergonomics, assess and modify postural abnormalities and instruct in home and community integration to attain remaining goals. Progress toward goals / Updated goals:  · Goals for this certification period include and are to be achieved in   2-4  weeks from 12/1/2020:  1. Patient to be Independent with HEP to self-manage/prevent symptoms after DC. 2. Patient to demonstrate ability to negotiate stairs in reciprocal pattern without UE assist in pain free manner to increase ease in community mobility.   3.  Patient to demonstrate pain free pivot turn to reduce risk for falls.      PLAN  [x]  Upgrade activities as tolerated no Continue plan of care   []  Discharge due to :    []  Other:      Therapist: Saundra Beltre PTA    Date: 12/8/2020 Time: 1:00 PM     Future Appointments   Date Time Provider Jomar Henson   12/11/2020  8:45 AM Griffin Rosen [facility]   12/15/2020  8:45 AM Alba Ruff PT [facility]   12/17/2020 10:15 AM [facility]   1/28/2021  9:00 AM Carlos Heath NP [facility]

## 2020-12-11 ENCOUNTER — HOSPITAL ENCOUNTER (OUTPATIENT)
Dept: PHYSICAL THERAPY | Age: 75
Discharge: HOME | End: 2020-12-11
Payer: MEDICARE

## 2020-12-11 PROCEDURE — 97530 THERAPEUTIC ACTIVITIES: CPT

## 2020-12-11 PROCEDURE — 97110 THERAPEUTIC EXERCISES: CPT

## 2020-12-11 NOTE — PROGRESS NOTES
PHYSICAL THERAPY - DAILY TREATMENT NOTE    Patient Name: Melanie Blevinsr        Date: 2020  : 1945   YES Patient  Verified  Visit #:     Insurance: Payor: Tanvi Sheppard / Plan: [place] MEDICARE PART A & B / Product Type: Medicare /      In time: 8:46 Out time: 9:37   Total Treatment Time: 51     Medicare/BCBS [place] Time Tracking (below)   Total Timed Codes (min):  41 1:1 Treatment Time:  41     TREATMENT AREA =  Pain in left knee [M25.562]    SUBJECTIVE    Pain Level (on 0 to 10 scale):  0-1  / 10   Medication Changes/New allergies or changes in medical history, any new surgeries or procedures? NO    If yes, update Summary List   Subjective Functional Status/Changes:  []  No changes reported     \"Same pain. \"        OBJECTIVE    Modalities Rationale: decrease inflammation and decrease pain to improve patient's ability to  safely perform ADLs/transfers/squatting/prolong stding and ambulation/stair negotiation with minimal or no c/o pain   min [] Estim, type/location:                                      []  att     []  unatt     []  w/US     []  w/ice    []  w/heat    min []  Mechanical Traction: type/lbs                   []  pro   []  sup   []  int   []  cont    []  before manual    []  after manual    min []  Ultrasound, settings/location:      min []  Iontophoresis w/ dexamethasone, location:                                               []  take home patch       []  in clinic   10 min [x]  Ice     []  Heat    location/position: CP to left knee, pt Supine with LE wedge    min []  Vasopneumatic Device, press/temp:     min []  Other:    [x] Skin assessment post-treatment (if applicable):    [x]  intact    [x]  redness- no adverse reaction     []redness  adverse reaction:      31 min Therapeutic Exercise:  [x]  See flow sheet   Rationale:    Increase LE ROM/flexibility, increase strength and increase proprioception to improve the patients ability to perform ADL's and gait safely and I to allow for increased activity tolerance and increased functional mobility. 10 min Therapeutic Activity: step ups, step downs, squats, SLS   Rationale:   Increase knee ROM and increase tissue extensibility to facilitate normal gait mechanics and increased ease with ADL's.       min Patient Education:  YES  Reviewed HEP   []  Progressed/Changed HEP based on: Other Objective/Functional Measures:    Min VCing for form/knee alignment with step up/down and mini squats. Noted edema in left distal LE. Educated pt on use of compression stocking to manage LE edema. Post Treatment Pain Level (on 0 to 10) scale:   0  / 10     ASSESSMENT    Assessment/Changes in Function:     Pt demo's compensatory genu valgus left>right knee with therapeutic activity. []  See Progress Note/Recertification   Patient will continue to benefit from skilled PT services to modify and progress therapeutic interventions, address functional mobility deficits, address ROM deficits and address strength deficits to attain remaining goals. Progress toward goals / Updated goals:    1. Patient to be Independent with HEP to self-manage/prevent symptoms after DC. Nicanor Mckeon for form with therapeutic exercise 12/11/2020  2. Patient to demonstrate ability to negotiate stairs in reciprocal pattern without UE assist in pain free manner to increase ease in community mobility.   3.  Patient to demonstrate pain free pivot turn to reduce risk for falls.      PLAN    []  Upgrade activities as tolerated YES Continue plan of care   []  Discharge due to :    []  Other:      Therapist: Althea Mackenzie PTA    Date: 12/11/2020 Time: 8:53 AM     Future Appointments   Date Time Provider Jomar Henson   12/15/2020  8:45 AM America Murray PT [facility]   12/15/2020  9:40 AM Mamta Neal NP [facility]   12/17/2020 10:15 AM America Murray, PT [facility]   1/28/2021  9:00 AM Osiris Heath, NP [facility]

## 2020-12-15 ENCOUNTER — HOSPITAL ENCOUNTER (OUTPATIENT)
Dept: PHYSICAL THERAPY | Age: 75
Discharge: HOME | End: 2020-12-15
Payer: MEDICARE

## 2020-12-15 ENCOUNTER — APPOINTMENT (OUTPATIENT)
Dept: PHYSICAL THERAPY | Age: 75
End: 2020-12-15
Payer: MEDICARE

## 2020-12-15 PROCEDURE — 97110 THERAPEUTIC EXERCISES: CPT

## 2020-12-15 PROCEDURE — 97530 THERAPEUTIC ACTIVITIES: CPT

## 2020-12-15 PROCEDURE — 97016 VASOPNEUMATIC DEVICE THERAPY: CPT

## 2020-12-17 ENCOUNTER — HOSPITAL ENCOUNTER (OUTPATIENT)
Dept: PHYSICAL THERAPY | Age: 75
Discharge: HOME | End: 2020-12-17
Payer: MEDICARE

## 2020-12-17 PROCEDURE — 97110 THERAPEUTIC EXERCISES: CPT

## 2020-12-17 PROCEDURE — 97016 VASOPNEUMATIC DEVICE THERAPY: CPT

## 2020-12-17 PROCEDURE — 97530 THERAPEUTIC ACTIVITIES: CPT

## 2020-12-17 NOTE — PROGRESS NOTES
PHYSICAL THERAPY - DAILY TREATMENT NOTE    Patient Name: Alcira Louie        Date: 2020  : 1945   YES Patient  Verified  Visit #:     Insurance: Payor: Stacey Alva / Plan: [place] MEDICARE PART A & B / Product Type: Medicare /      In time: 10:05 Out time: 11:05   Total Treatment Time: 60     Medicare/BCBS Time Tracking (below)   Total Timed Codes (min):  60 1:1 Treatment Time:  45     TREATMENT AREA = Pain in left knee [M25.562]    SUBJECTIVE    Pain Level (on 0 to 10 scale):  0  / 10   Medication Changes/New allergies or changes in medical history, any new surgeries or procedures? NO    If yes, update Summary List   Subjective Functional Status/Changes:  []  No changes reported     \"They gave me some pre and post op exercises to do, they're no where near as aggressive as what we've been doing here. \"          OBJECTIVE     Therapeutic Procedures:  Min Procedure Specifics + Rationale   n/a [x]  Patient Education (performed throughout session) [x] Review HEP    [] Progressed/Changed HEP based on:   [] proper performance and advancement of Therex/TA   [] reduction in pain level    [] increased functional capacity       [] change in directional preference   35(35) [x] Therapeutic Exercise   [x]  See Flowsheet   Rationale: increase ROM and increase strength to improve the patients ability to participate in ADL's    10(10) [x] Therapeutic Activity   [x]  See Flowsheet  Re-assessment  Rationale:  To improve safety, proprioception, coordination, and efficiency with tasks     Modality rationale: decrease inflammation, decrease pain, increase tissue extensibility and increase muscle contraction/control to improve the patients ability to perform ADL's with greater ease     Min Type Additional Details   10 [x]  Vasopneumatic Device  [] pre-KAILEY      [x] post-KAILEY  []  Ice massage Location:left knee    [] supine              [] prone  [] legs elevated    [] legs flat   [] sitting   [x] Skin assessment post-treatment:  [x]intact [x]redness- no adverse reaction       []redness  adverse reaction:     Other Objective/Functional Measures:    See DC     Post Treatment Pain Level (on 0 to 10) scale:   0  / 10     ASSESSMENT    Assessment/Changes in Function:       See DC         Patient will continue to benefit from skilled PT services to n/a  to attain remaining goals   Progress toward goals / Updated goals:    See DC     PLAN    [x]  Upgrade activities as tolerated  [x]  Update interventions per flow sheet NO Continue plan of care   []  Discharge due to :    []  Other:      Therapist: Henrik Mendoza \"BJ\" Regine, JEFFT, Cert. MDT, Cert. DN, Cert. SMT, Dip.  Osteopractic    Date: 12/17/2020 Time: 10:27 AM     Future Appointments   Date Time Provider Jomar Henson   1/5/2021  8:00 AM [facility] Open[facility]   1/28/2021  9:00 AM Garcia Heath President, NP [facility] Open[facility]

## 2020-12-17 NOTE — PROGRESS NOTES
Andrew [address] [facility] PHYSICAL THERAPY  [address] [facility] [address], [address] - Phone: [phone]  Fax: [phone] SUMMARY  Patient Name: Clari Edward : [birth year]   Treatment/Medical Diagnosis: Pain in left knee [M25.562]   Referral Source: Anna Galeano MD     Date of Initial Visit: 9/3/2020 Attended Visits: 25 Missed Visits: 1     SUMMARY OF TREATMENT  Patient's POC has consisted of therex, therapeutic activities, manual therapy prn, modalities prn, pt. education, and a comprehensive HEP. Treatment strategies used to address functional mobility deficits, ROM deficits, strength deficits, analyze and address soft tissue restrictions, analyze and cue movement patterns, analyze and modify body mechanics/ergonomics, assess and modify postural abnormalities and instruct in home and community integration. CURRENT STATUS  Patient was originally planning to finish remaining scheduled sessions prior to his upcoming surgery in January. However, he has reached the maximum number of sessions authorized by his insurance. He reports no resting pain, with improvement in ability to ascend stairs. However, stair descent continues to reproduce some pain. Left knee AROM currently 3-103 degrees, PROM 0-110 degrees. GOALS/MEASURE OF PROGRESS Goal Met? 1. Patient to be Independent with HEP to self-manage/prevent symptoms after DC. 2. Patient to demonstrate ability to negotiate stairs in reciprocal pattern without UE assist in pain free manner to increase ease in community mobility.   3.  Patient to demonstrate pain free pivot turn to reduce risk for falls. MET    Partially Met        Met     RECOMMENDATIONS  Other: Patient's insurance has reached its max, and he is now scheduled for sx in January. Patient provided with comprehensive HEP until he undergoes surgery for TKA    If you have any questions/comments please contact us directly at [phone].    Thank you for allowing us to assist in the care of your patient. Therapist Signature: Caleb Londono \"BJ\" Lesley Vieyra, DPT, Cert. MDT, Cert. DN, Cert. SMT, Dip. Osteopractic Date: 12/17/2020   Reporting Period: 11/4/0493-78/60/9005     Certification Period 12/2/2020-1/2/21 Time: 10:39 AM     NOTE TO PHYSICIAN:  PLEASE COMPLETE THE ORDERS BELOW AND FAX TO   [facility] Physical Therapy: [phone]  If you are unable to process this request in 24 hours please contact our office: [phone]    ___ I have read the above report and request that my patient continue as recommended.   ___ I have read the above report and request that my patient continue therapy with the following changes/special instructions:_________________________________________________________   ___ I have read the above report and request that my patient be discharged from therapy.      Physician Signature:        Date:     Time:

## 2021-02-15 ENCOUNTER — HOSPITAL ENCOUNTER (OUTPATIENT)
Dept: PHYSICAL THERAPY | Age: 76
Discharge: HOME | End: 2021-02-15
Payer: MEDICARE

## 2021-02-15 PROCEDURE — 97014 ELECTRIC STIMULATION THERAPY: CPT

## 2021-02-15 PROCEDURE — 97140 MANUAL THERAPY 1/> REGIONS: CPT

## 2021-02-15 PROCEDURE — 97110 THERAPEUTIC EXERCISES: CPT

## 2021-02-15 PROCEDURE — 97161 PT EVAL LOW COMPLEX 20 MIN: CPT

## 2021-02-15 NOTE — PROGRESS NOTES
PHYSICAL THERAPY - DAILY TREATMENT NOTE  Patient Name: Monika Berrios        Date: 2/15/2021  : 1945   [x]  Patient  Verified  Visit #:   1     Insurance: Payor: [place] MEDICARE / Plan: VA MEDICARE PART A & B / Product Type: Medicare /      In time:   11:43          Out time:   11:40 Total Treatment Time (min):   57   Medicare Time Tracking (below)   Total Timed Codes (min):  38 1:1 Treatment Time:  23     TREATMENT AREA = Left knee pain [M25.562]  Status post left knee surgery [Z98.890]    SUBJECTIVE    Pain Level (on 0 to 10 scale):  4  / 10   Medication Changes/New allergies or changes in medical history, any new surgeries or procedures? []  No    []  Yes   If yes, update Summary List:    Subjective Functional Status/Changes:  []  No changes reported     HISTORY    Present Symptoms: Left knee post-op pain    Present since: DOS 21  [] Improving []  Unchanging []  Worsening          Commenced as a result of:    or []  No apparent reason   or Surgery- TKA. Stayed 1 night in the hospital, had 4 weeks of home health PT. Was DC'd 21    Symptoms at onset:    Constant symptoms: Ache and swelling. Intermittent symptoms: sharp pain to knee    What produces or worsens: standing, walking, static sitting when eating a meal. Step to pattern on stairs. What stops or reduces: medication, moving. Continued use makes the pain:  [] Better [x]  Worse []  No effect     Disturbed night: [] No    [x] Yes    Pain at rest: [] No    [x] Yes       Treatments this episode: per above    Previous episodes: Pre-hab before surgery  Paraesthesia: [] No    [] Yes     Effect of medications (if appropriate)-    General Health:  [] Good []  Fair []  Poor     Imaging:   [] Yes []  No       Work:  Mechanical Stresses: retired [employer]    Leisure: Mechanical Stresses: yard work     Functional disability from present episode: per above.      Summary:    [] Acute []  Sub-acute []  Chronic     [] Trauma/Surgery [] Insidious onset        OBJECTIVE    Gait:  [] Normal    [x] Abnormal    [] Antalgic    [] NWB    Device:    Describe:     Myotome Level Muscles Nerve Reflex Sensation Action   L1-L3 Iliopsoas T12/L1-3  Quadriceps L2-4  Adductors L2-L4 (Iliacus)- Femoral  Femoral  Obturator/Sciatic N/A  L3-4 = Patella L1- Inguinal Crease  L2- Anterior Thigh  L3- Anterior Thigh above knee Hip Flexion  Knee Extension   L4 Tibialis anterior L4&5   Deep Peroneal Patella Anterior Knee Suprapatellar Ankle Dorsiflexion   L5 Extensor Hallucis Longus  Extensor Digitorum Lungus  Gluteus Medius Deep Peroneal         Superior Gluteal None Reliable Dorsum of Foot/Great Toe  Anterior Shank Extensor  to Great Toe        Hip Internal Rotation   S1 Peroneus Longus  Gastrocnemius & Soleus  Gluteus Ykrie Superficial Peroneal  Tibial    Inferior Gluteal Achilles Tendon Lateral Shank around Lateral Malleolus  Lateral Aspect/Dorsum of GT   Plantar Flexion      Hip Extension   Notable findings from above: unremarkable    ROM / Strength  [] Unable to assess                  AROM                      PROM                   Strength (1-5)    Left Right Left Right Left Right   Hip Flexion          Extension          Abduction          Adduction               AROM          PROM                      Strength (1-5)    Left Right Left Right Left Right   Knee Flexion 80  95  3     Extension -20  -7  3      Ankle Plantarflexion          Dorsiflexion           Flexibility: [] Unable to assess at this time  Hamstrings:    (L) Tightness= [] WNL   [] Min   [] Mod   [] Severe    (R) Tightness= [] WNL   [] Min   [] Mod   [] Severe  Quadriceps:    (L) Tightness= [] WNL   [] Min   [] Mod   [] Severe    (R) Tightness= [] WNL   [] Min   [] Mod   [] Severe  Gastroc:      (L) Tightness= [] WNL   [] Min   [] Mod   [] Severe    (R) Tightness= [] WNL   [] Min   [] Mod   [] Severe  Other:    Palpation:   Neg/Pos  Neg/Pos  Neg/Pos   Joint Line  Quad tendon  Patellar ligament Patella  Fibular head  Pes Anserinus    Tibial tubercle  Hamstring tendons  Infrapatellar fat pad      Optional Tests:  Patellar Positioning (Static)   []L []R Normal []L []R Lateral   []L []R Wyvonne Pretty      []L []R Medial   []L []R [place]    Patellar Tracking   []L []R Glide (Lat)   []L []R Tilt (Lat)     []L []R Glide (Med)  []L []R Tilt (Med)      []L []R Tile (Inf)     Patellar Mobility   []L []R Hypermobile []L []R Hypomobile             Therapeutic Procedures:  Min Procedure Specifics + Rationale   n/a [x]  Patient Education (performed throughout session) [x] Review HEP    [] Progressed/Changed HEP based on:   [] proper performance and advancement of Therex/TA   [] reduction in pain level    [] increased functional capacity       [] change in directional preference   13 [x] Therapeutic Exercise   [x]  See Flowsheet   Rationale: increase ROM and increase strength to improve the patients ability to participate in ADL's    10 [x]  Manual Therapy [] STM/DTM     [] IASTM     [] Scar Massage  [] Cross Friction       [] PNF         [] PROM    [] TDN (see objective; actual needle insertion time not billed)   [] Joint mobilization: Gr I [] II []  III [] IV[] V[]:  Involved Knee Patellar G2-G4 mobs inferior/medial, STM to quads/HS/ITB interface, Ant/Post G3-G4 Tibiofemoral glides, PROM flexion/Ext contract/relax  Rationale: decrease pain, increase ROM, increase tissue extensibility, decrease trigger points and increase postural awareness to attain functional use and participation with ADL's     Modality rationale: decrease inflammation, decrease pain, increase tissue extensibility and increase muscle contraction/control to improve the patients ability to perform ADL's with greater ease     Min Type Additional Details   15 [] E-Stim:        [] Att           [] Unatt        [] Premod   [x] IFC  [] NMES   [] EDN   Location:left knee  [x] supine              [] prone  [] legs elevated   [] legs flat  []w/heat  [x]w/ice  [] sitting   [x] Skin assessment post-treatment:  [x]intact [x]redness- no adverse reaction       []redness  adverse reaction:         Post Treatment Pain Level (on 0 to 10) scale:   4 / 10     ASSESSMENT    Assessment/Changes in Function:   Justification for Eval Code Complexity:  Patient History : high  Examination low  Clinical Presentation: low  Clinical Decision Making : FOTO mod   []  See Progress Note/Recertification   Patient will continue to benefit from skilled PT services to  modify and progress therapeutic interventions, address functional mobility deficits, address ROM deficits, address strength deficits, analyze and address soft tissue restrictions, analyze and cue movement patterns, analyze and modify body mechanics/ergonomics, assess and modify postural abnormalities and instruct in home and community integration to attain remaining goals       [x]  Upgrade activities as tolerated []  Continue plan of care   []  Discharge due to :    [x]  Other: Initiate POC     Therapist: Peewee Benton \"GERARDO\" JEFF FongT, Cert. MDT, Cert. DN, Cert. SMT, Dip.  Osteopractic Date: 2/15/2021     Time: 11:47 AM

## 2021-02-15 NOTE — PROGRESS NOTES
[facility] PHYSICAL THERAPY  [address] [facility] Gail Burleson [employer], [address] [facility] [employer] [address] - Phone: [phone]  Fax: [phone] / [address]  Patient Name: Zina Freeman : 1945   Medical   Diagnosis: Left knee pain [M25.562]  Status post left knee surgery [Z98.890] Treatment Diagnosis: S/p left knee TKA   Onset Date: DOS 21     Referral Source: Runell Cage Start of Care [facility]): 2/15/2021   Prior Hospitalization: See medical history Provider #: [id number]   Prior Level of Function: Chronic left knee pain, avid    Comorbidities: OA, HTN, Kidney dysfunction   Medications: Verified on Patient Summary List   The Plan of Care and following information is based on the information from the initial evaluation.   ===========================================================================================  Assessment / key information: Patient is a 76 y.o. male presenting to clinic with CC left knee pain s/p left TKA. He reports completing 4 weeks of HH PT, and presents without AD. Patient reports pain is averaging 4/10 at rest, limiting ADL tolerance. He demonstrates left knee AROM = 20-80 degrees, PROM = 7-95 degrees. Incision appears to be healing well. Pt  will benefit from physical therapist management to address his impairments (listed below),  educate him, and improve his level of function.  Thanks for your referral.   ===========================================================================================  Eval Complexity: History: HIGH Complexity :3+ comorbidities / personal factors will impact the outcome/ POC Exam:LOW Complexity : 1-2 Standardized tests and measures addressing body structure, function, activity limitation and / or participation in recreation  Presentation: LOW Complexity : Stable, uncomplicated  Clinical Decision Making:MEDIUM Complexity : FOTO score of 26-74Overall Complexity:LOW   Problem List: pain affecting function, decrease ROM, decrease strength, edema affecting function, impaired gait/ balance, decrease ADL/ functional abilitiies, decrease activity tolerance, decrease flexibility/ joint mobility and decrease transfer abilities  FOTO score: 34 indicating 56% functional disability  Treatment Plan may include any combination of the following: Therapeutic exercise, Therapeutic activities, Neuromuscular re-education, Physical agent/modality, Gait/balance training, Manual therapy, Aquatic therapy, Patient education, Self Care training, Functional mobility training, Home safety training and Stair training  Patient / Family readiness to learn indicated by: asking questions, trying to perform skills and interest  Persons(s) to be included in education: patient (P)  Barriers to Learning/Limitations: None  Measures taken: n/a   Patient Goal (s): \"Move more with less pain\"   Patient self reported health status: good  Rehabilitation Potential: good   Short Term Goals: To be accomplished in  3-4  weeks:  1. Patient to be adherent to HEP to facilitate pain control with ADL's.  2. Patient to demonstrate left knee PROM > 110 degrees flexion to facilitate ease in donning/doffing. 3. Patient to demonstrate left knee PROM extension to < 3 degrees to facilitate a more normalized gait.  Long Term Goals: To be accomplished in  6-8  weeks:  1. Patient to be Safe and Independent with HEP to self-manage/prevent symptoms after DC. 2. Patient to increase FS FOTO score to > 60 to indicate increased functional independence. 3. Patient to demonstrate safe and independent floor to stand transfers to allow him to resume gardening. Frequency / Duration:   Patient to be seen  2  times per week for 6-8  weeks:  Patient / Caregiver education and instruction: activity modification and exercises.  We reviewed our facility's Patient Personal Responsibilities (PPR) form, particularly in regards to compliance towards his appointment time, our attendance policy, and his home exercise program. Patient was informed of possible discharge for non-compliance to our attendance policy per PPR form. We also discussed his POC as deemed appropriate by the treating therapist and physician. Patient verbalized understanding that he must show objective and functional improvement in an appropriate time frame. Patient verbalized understanding that should progress or compliance be lacking, we will contact the referring physician for further consultation to address and attempt to establish alternate treatment strategies as necessary and/or possibly discharge. Therapist Signature: Cleo Henriquez \"BJ\" Keira Schmidt, DPT, Cert. MDT, Cert. DN, Cert. SMT, Dip. Osteopractic Date: 8/96/2001   Certification Period: 2/15/21-5/14/21 Time: 12:39 PM   ===========================================================================================  I certify that the above Physical Therapy Services are being furnished while the patient is under my care. I agree with the treatment plan and certify that this therapy is necessary. Physician Signature:        Date:       Time:                                         Fauzia Menchaca PA-C   Please sign and return to [employer] [facility] or you may fax the signed copy to [phone]. Thank you.

## 2021-02-18 ENCOUNTER — HOSPITAL ENCOUNTER (OUTPATIENT)
Dept: PHYSICAL THERAPY | Age: 76
Discharge: HOME | End: 2021-02-18
Payer: MEDICARE

## 2021-02-18 PROCEDURE — 97110 THERAPEUTIC EXERCISES: CPT

## 2021-02-18 PROCEDURE — 97530 THERAPEUTIC ACTIVITIES: CPT

## 2021-02-18 PROCEDURE — 97140 MANUAL THERAPY 1/> REGIONS: CPT

## 2021-02-18 NOTE — PROGRESS NOTES
PHYSICAL THERAPY - DAILY TREATMENT NOTE    Patient Name: Pearlie Goldberg        Date: 2021  : 1945   YES Patient  Verified  Visit #:   2   of     Insurance: Payor: Karlos Hackett / Plan: VA MEDICARE PART A & B / Product Type: Medicare /      In time: 11:45 Out time: 12:35   Total Treatment Time: 50     Medicare/BCBS [place] Time Tracking (below)   Total Timed Codes (min):  50 1:1 Treatment Time:  50     TREATMENT AREA =  Left knee pain [M25.562]  Status post left knee surgery [Z98.890]    SUBJECTIVE    Pain Level (on 0 to 10 scale):  2  / 10   Medication Changes/New allergies or changes in medical history, any new surgeries or procedures? NO    If yes, update Summary List   Subjective Functional Status/Changes:  []  No changes reported     Pt reports mild pain just superior to patella; he reports its tolerable. OBJECTIVE    30 min Therapeutic Exercise:  [x]  See flow sheet   Rationale:      increase ROM and increase strength to improve the patients ability to perform ADLs with less compensatory patterns      8 min Therapeutic Activity: Sit to stands from chair; step up/downs first step   Rationale:    To improve pt functional abilities which include negotiating stairs at home and transitioning from sitting >< standing     12 min Manual Therapy: Astym LLE to aide in fluid exchange, pain reduction, and tissue mobility; PROM to knee FL in supine    Rationale:      decrease pain, increase ROM, increase tissue extensibility and decrease edema  to improve patient's ability to perform ADLs with improved ease   The manual therapy interventions were performed at a separate and distinct time from the therapeutic activities interventions    Modalities Rationale:    decrease edema, decrease inflammation and decrease pain to improve patient's ability to perform ADLs with less pain   10 min [x]  Ice     []  Heat    location/position: Supine post tx; left knee    [x] Skin assessment post-treatment (if applicable):    [x]  intact    []  redness- no adverse reaction     []redness  adverse reaction:       min Patient Education:  YES  Reviewed HEP   []  Progressed/Changed HEP based on:   Updated HEP to include scar mobilizations and passive knee ext stretch      Other Objective/Functional Measures:    Difficulty with sit to stands from chair; able perform without use of BUE; pt has to extend knee out slightly secondary to flexion ROM limitations. Balance impairments noted, LOB a couple of times during this activity - able to catch self. Pt with extension lag of 5* after passive stretching and manual techniques. Pt admits to sleeping with pillow underneath knee; advised patient against doing this. Pt at 95* passive knee flexion in supine  Instructed pt on how to perform scar mobilization to incision at home       Post Treatment Pain Level (on 0 to 10) scale:   3  / 10     ASSESSMENT    Assessment/Changes in Function:     Pt with ROM deficits in both EXT (5* lag) and flexion (95* passive supine). Look to continue working to improve and progress as symptoms allow. []  See Progress Note/Recertification   Patient will continue to benefit from skilled PT services to analyze, cue, progress, modify,, demonstrate, instruct, and address, movement patterns, therapeutic interventions, postural abnormalities, soft tissue restrictions, ROM, strength, functional mobility, body mechanics/ergonomics, and home and community integration, to attain remaining goals. Progress toward goals / Updated goals: · Short Term Goals: To be accomplished in  3-4  weeks:  1. Patient to be adherent to HEP to facilitate pain control with ADL's. In progress   2. Patient to demonstrate left knee PROM > 110 degrees flexion to facilitate ease in donning/doffing. In progress3. Patient to demonstrate left knee PROM extension to < 3 degrees to facilitate a more normalized gait. in progress  · Long Term Goals:  To be accomplished in  6-8 weeks: 1. Patient to be Safe and Independent with HEP to self-manage/prevent symptoms after DC. 2. Patient to increase FS FOTO score to > 60 to indicate increased functional independence. 3. Patient to demonstrate safe and independent floor to stand transfers to allow him to resume gardening.      PLAN    []  Upgrade activities as tolerated YES Continue plan of care   []  Discharge due to :    []  Other:      Therapist: Cielo Sanford, PT, DPT    Date: 2/18/2021 Time: 11:50 AM     Future Appointments   Date Time Provider Jomar Henson   2/23/2021  1:15 PM Omntana Jony, PT [facility] [address]   2/25/2021 10:15 AM Montana Jony, PT [facility] [address]   3/2/2021  8:45 AM Montana Jony, PT [facility] [address]   3/4/2021  9:00 AM Jennifer Rocha, DO [address] [place] [address]   3/4/2021 12:30 PM Montana Jony, PT [facility] [address]   3/9/2021  8:45 AM Montana Jony, PT [facility] [address]   3/11/2021  8:45 AM Montana Jony, PT [facility] [address]   3/16/2021  8:45 AM Montana Jony, PT [facility] [address]   3/18/2021  8:45 AM Montana Jony, PT [facility] [address]   3/23/2021  8:45 AM Montana Jony, PT [facility] [address]   3/25/2021  8:45 AM Montana Jony, PT [facility] [address]   3/30/2021  8:45 AM [facility] [address]

## 2021-02-23 ENCOUNTER — HOSPITAL ENCOUNTER (OUTPATIENT)
Dept: PHYSICAL THERAPY | Age: 76
Discharge: HOME | End: 2021-02-23
Payer: MEDICARE

## 2021-02-23 PROCEDURE — 97014 ELECTRIC STIMULATION THERAPY: CPT

## 2021-02-23 PROCEDURE — 97110 THERAPEUTIC EXERCISES: CPT

## 2021-02-23 PROCEDURE — 97140 MANUAL THERAPY 1/> REGIONS: CPT

## 2021-02-25 ENCOUNTER — HOSPITAL ENCOUNTER (OUTPATIENT)
Dept: PHYSICAL THERAPY | Age: 76
Discharge: HOME | End: 2021-02-25
Payer: MEDICARE

## 2021-02-25 PROCEDURE — 97014 ELECTRIC STIMULATION THERAPY: CPT

## 2021-02-25 PROCEDURE — 97140 MANUAL THERAPY 1/> REGIONS: CPT

## 2021-02-25 PROCEDURE — 97110 THERAPEUTIC EXERCISES: CPT

## 2021-03-02 ENCOUNTER — HOSPITAL ENCOUNTER (OUTPATIENT)
Dept: PHYSICAL THERAPY | Age: 76
Discharge: HOME | End: 2021-03-02
Payer: MEDICARE

## 2021-03-02 PROCEDURE — 97530 THERAPEUTIC ACTIVITIES: CPT

## 2021-03-02 PROCEDURE — 97140 MANUAL THERAPY 1/> REGIONS: CPT

## 2021-03-02 PROCEDURE — 97110 THERAPEUTIC EXERCISES: CPT

## 2021-03-02 PROCEDURE — 97014 ELECTRIC STIMULATION THERAPY: CPT

## 2021-03-02 NOTE — PROGRESS NOTES
PHYSICAL THERAPY - DAILY TREATMENT NOTE    Patient Name: Alonso Queen        Date: 3/2/2021  : 1945   YES Patient  Verified  Visit #:   5   of     Insurance: Payor: Philippe Love / Plan: VA MEDICARE PART A & B / Product Type: Medicare /      In time: 8:35early Out time: 9:45   Total Treatment Time: 70     Medicare/BCBS Time Tracking (below)   Total Timed Codes (min):  70 1:1 Treatment Time:  53     TREATMENT AREA = Left knee pain [M25.562]  Status post left knee surgery [Z98.890]    SUBJECTIVE    Pain Level (on 0 to 10 scale):  3  / 10   Medication Changes/New allergies or changes in medical history, any new surgeries or procedures? NO    If yes, update Summary List   Subjective Functional Status/Changes:  []  No changes reported     \"It's really sore today. I walked half a mile yesterday later in the day.  \"          OBJECTIVE     Therapeutic Procedures:  Min Procedure Specifics + Rationale   n/a [x]  Patient Education (performed throughout session) [x] Review HEP    [] Progressed/Changed HEP based on:   [] proper performance and advancement of Therex/TA   [] reduction in pain level    [] increased functional capacity       [] change in directional preference   20 [x] Therapeutic Exercise   [x]  See Flowsheet   Rationale: increase ROM and increase strength to improve the patients ability to participate in ADL's    25 [x]  Manual Therapy       [] IASTM - FRAM, Static CUpping  [x] Involved Knee Patellar G2-G4 mobs inferior/medial, STM to quads/HS/ITB interface, Ant/Post G3-G4 Tibiofemoral glides, PROM flexion/Ext contract/relax  Rationale: decrease pain, increase ROM, increase tissue extensibility, decrease trigger points and increase postural awareness to attain functional use and participation with ADL's  [x] Skin assessment post-treatment:  [x]intact [x]redness- no adverse reaction   []redness  adverse  The manual therapy interventions were performed at a separate and distinct time from the therapeutic activities interventions. 8 [x] Therapeutic Activity   [x]  See Flowsheet  Re-assessment  Rationale: To improve safety, proprioception, coordination, and efficiency with tasks   15  Modality rationale: decrease inflammation, decrease pain, increase tissue extensibility and increase muscle contraction/control to improve the patients ability to perform ADL's with greater ease     Min Type Additional Details   15 [x] E-Stim Type:IFC  Attended? NO Location: left knee  [x] supine              [] prone  [x] legs elevated   [] legs flat  []w/heat  [x]w/ice  [] sitting   [] Vasopneumatic Device    [x] Skin assessment post-treatment:  [x]intact [x]redness- no adverse reaction       []redness  adverse reaction:     Other Objective/Functional Measures:    AROM = -13-90, PROM= -5-100 degrees. Post Treatment Pain Level (on 0 to 10) scale:   0  / 10     ASSESSMENT    Assessment/Changes in Function:       Improvement in ROM         Patient will continue to benefit from skilled PT services to modify and progress therapeutic interventions, address functional mobility deficits, address ROM deficits, address strength deficits, analyze and address soft tissue restrictions, analyze and cue movement patterns, analyze and modify body mechanics/ergonomics and instruct in home and community integration  to attain remaining goals   Progress toward goals / Updated goals: Increased walking and standing tolerance     PLAN    [x]  Upgrade activities as tolerated  [x]  Update interventions per flow sheet YES Continue plan of care   []  Discharge due to :    []  Other:      Therapist: Jayne Navarrete \"BJ\" MARY Weiner, Cert. MDT, Cert. DN, Cert. SMT, Dip.  Osteopractic    Date: 3/2/2021 Time: 8:53 AM     Future Appointments   Date Time Provider Jomar Henson   3/4/2021  9:00 AM [place], [place], [place]   3/4/2021 12:30 PM Sahara Miguel PT [place] [address] [address]   3/9/2021  8:45 AM Sahara Miguel PT [place] [address] [address]unn   3/11/2021  8:45 AM Cinthya Thao, PT [facility]   3/16/2021  8:45 AM Cinthya Thao, PT [facility]   3/18/2021  8:45 AM Cinthya Thao, PT [facility]   3/25/2021  8:45 AM Cinthya Thao, PT [facility]   3/30/2021  8:45 AM Cinthya Thao, PT [facility]

## 2021-03-04 ENCOUNTER — HOSPITAL ENCOUNTER (OUTPATIENT)
Dept: PHYSICAL THERAPY | Age: 76
Discharge: HOME | End: 2021-03-04
Payer: MEDICARE

## 2021-03-04 PROBLEM — M17.9 OA (OSTEOARTHRITIS) OF KNEE: Status: ACTIVE | Noted: 2021-01-12

## 2021-03-04 PROCEDURE — 97014 ELECTRIC STIMULATION THERAPY: CPT

## 2021-03-04 PROCEDURE — 97140 MANUAL THERAPY 1/> REGIONS: CPT

## 2021-03-04 PROCEDURE — 97110 THERAPEUTIC EXERCISES: CPT

## 2021-03-04 NOTE — PROGRESS NOTES
PHYSICAL THERAPY - DAILY TREATMENT NOTE    Patient Name: Mukesh Amaro        Date: 3/4/2021  : 1945   YES Patient  Verified  Visit #:   6     Insurance: Payor: Carly Patton / Plan: [place] MED[place] & B / Product Type: Medicare /      In time: 12:25 Out time: 120   Total Treatment Time: 55     Medicare/BCBS Time Tracking (below)   Total Timed Codes (min):  55 1:1 Treatment Time:  40     TREATMENT AREA = Left knee pain [M25.562]  Status post left knee surgery [Z98.890]    SUBJECTIVE    Pain Level (on 0 to 10 scale):  3  / 10   Medication Changes/New allergies or changes in medical history, any new surgeries or procedures? NO    If yes, update Summary List   Subjective Functional Status/Changes:  []  No changes reported     \"I walked around at [employer] today. The swelling has gone down from the beginning but it's still definitely there.  \"          OBJECTIVE     Therapeutic Procedures:  Min Procedure Specifics + Rationale   n/a [x]  Patient Education (performed throughout session) [x] Review HEP    [] Progressed/Changed HEP based on:   [] proper performance and advancement of Therex/TA   [] reduction in pain level    [] increased functional capacity       [] change in directional preference   30 [x] Therapeutic Exercise   [x]  See Flowsheet   Rationale: increase ROM and increase strength to improve the patients ability to participate in ADL's    10 [x]  Manual Therapy       [] IASTM -  [x] Involved Knee Patellar G2-G4 mobs inferior/medial, STM to quads/HS/ITB interface, Ant/Post G3-G4 Tibiofemoral glides, PROM flexion/Ext contract/relax  Rationale: decrease pain, increase ROM, increase tissue extensibility, decrease trigger points and increase postural awareness to attain functional use and participation with ADL's  [x] Skin assessment post-treatment:  [x]intact [x]redness- no adverse reaction   []redness  adverse  The manual therapy interventions were performed at a separate and distinct time from the therapeutic activities interventions. Modality rationale: decrease inflammation, decrease pain, increase tissue extensibility and increase muscle contraction/control to improve the patients ability to perform ADL's with greater ease     Min Type Additional Details   15 [x] E-Stim Type:IFC  Attended? NO Location: Left Knee  [] supine              [] prone  [] legs elevated   [] legs flat  []w/heat  []w/ice  [] sitting   [] Vasopneumatic Device    [x] Skin assessment post-treatment:  [x]intact [x]redness- no adverse reaction       []redness  adverse reaction:     Other Objective/Functional Measures:    Discussed with patient possibility of lymphedema, and rescheduling with Irena Ramos Treatment Pain Level (on 0 to 10) scale:   0  / 10     ASSESSMENT    Assessment/Changes in Function:       Performed/participated in treatment well without complaints aside from muscular fatigue/deconditioning, indicating (+) response to current course of treatment to further improve functional status. Patient will continue to benefit from skilled PT services to modify and progress therapeutic interventions, address functional mobility deficits, address ROM deficits, address strength deficits, analyze and address soft tissue restrictions, analyze and cue movement patterns, analyze and modify body mechanics/ergonomics and instruct in home and community integration  to attain remaining goals   Progress toward goals / Updated goals:    Performed therex progression     PLAN    [x]  Upgrade activities as tolerated  [x]  Update interventions per flow sheet YES Continue plan of care   []  Discharge due to :    []  Other:      Therapist: Gabriel Natarajan \"BJ\" [address], DPT, [facility] [address]. MDT, Cert. DN, Cert. SMT, Dip.  Osteopractic    Date: 3/4/2021 Time: 12:35 PM     Future Appointments   Date Time Provider Jomar Henson   3/9/2021  8:45 AM Leona Rowe PT [facility]   3/11/2021  8:45 AM Leona Rowe PT [facility]   3/12/2021 9:30 AM Nely Rodriguez, DO [address]   3/16/2021  8:45 AM Fiordaliza Bhandari, PT [facility]   3/18/2021  8:45 AM Fiordaliza Bhandari, PT [facility]   3/25/2021  8:45 AM Fiordaliza Bhandari, PT [facility]   3/30/2021  8:45 AM Fiordaliza Bhandari, PT [facility]

## 2021-03-09 ENCOUNTER — HOSPITAL ENCOUNTER (OUTPATIENT)
Dept: PHYSICAL THERAPY | Age: 76
Discharge: HOME | End: 2021-03-09
Payer: MEDICARE

## 2021-03-09 PROCEDURE — 97014 ELECTRIC STIMULATION THERAPY: CPT

## 2021-03-09 PROCEDURE — 97016 VASOPNEUMATIC DEVICE THERAPY: CPT

## 2021-03-09 PROCEDURE — 97110 THERAPEUTIC EXERCISES: CPT

## 2021-03-09 NOTE — PROGRESS NOTES
PHYSICAL THERAPY - DAILY TREATMENT NOTE    Patient Name: Pearlie Goldberg        Date: 3/9/2021  : 1945   YES Patient  Verified  Visit #:   7     Insurance: Payor: Karlos Hackett / Plan: [place] MEDICARE PART A & B / Product Type: Medicare /      In time: 3:25 Out time: 4:05   Total Treatment Time: 40     Medicare/BCBS Time Tracking (below)   Total Timed Codes (min):  40 1:1 Treatment Time:  25     TREATMENT AREA = Left knee pain [M25.562]  Status post left knee surgery [Z98.890]    SUBJECTIVE    Pain Level (on 0 to 10 scale):  3  / 10   Medication Changes/New allergies or changes in medical history, any new surgeries or procedures? NO    If yes, update Summary List   Subjective Functional Status/Changes:  []  No changes reported     \"I walked about a mile today. Really aching right now. \"          OBJECTIVE     Therapeutic Procedures:  Min Procedure Specifics + Rationale   n/a [x]  Patient Education (performed throughout session) [x] Review HEP    [] Progressed/Changed HEP based on:   [] proper performance and advancement of Therex/TA   [] reduction in pain level    [] increased functional capacity       [] change in directional preference   25 [x] Therapeutic Exercise   [x]  See Flowsheet   Rationale: increase ROM and increase strength to improve the patients ability to participate in ADL's      Modality rationale: decrease inflammation, decrease pain, increase tissue extensibility and increase muscle contraction/control to improve the patients ability to perform ADL's with greater ease     Min Type Additional Details   15 [x] E-Stim Type:IFC  Attended? NO Location: Left Knee  [] supine              [] prone  [] legs elevated   [] legs flat  []w/heat  []w/ice  [] sitting   [x] Vasopneumatic Device    [x] Skin assessment post-treatment:  [x]intact [x]redness- no adverse reaction       []redness  adverse reaction:     Other Objective/Functional Measures:    Increased reps/sets/resistance per flow sheet.  Discussed alternatives to walking including walking in pool/beach and biking     Post Treatment Pain Level (on 0 to 10) scale:   0  / 10     ASSESSMENT    Assessment/Changes in Function:       Tolerated treatment well without complaints of progression, indicating improved functional capacity for ADL's. Patient will continue to benefit from skilled PT services to modify and progress therapeutic interventions, address functional mobility deficits, address ROM deficits, address strength deficits, analyze and cue movement patterns, analyze and modify body mechanics/ergonomics and instruct in home and community integration  to attain remaining goals   Progress toward goals / Updated goals: Increased walking tolerance     PLAN    [x]  Upgrade activities as tolerated  [x]  Update interventions per flow sheet YES Continue plan of care   []  Discharge due to :    []  Other:      Therapist: Clari Comment \"BJ\" MARY Weiner, Cert. MDT, Cert. DN, Cert. SMT, Dip.  Osteopractic    Date: 3/9/2021 Time: 3:59 PM     Future Appointments   Date Time Provider Jomar Henson   3/11/2021  8:45 AM Danny Tellez, PT [facility]   3/12/2021  9:30 AM Jennifer Rocha DO [address] [facility]   3/16/2021  8:45 AM Danny Tellez, PT [facility]   3/18/2021  8:45 AM Danny Tellez, PT [facility]   3/25/2021  8:45 AM Danny Tellez PT [facility]   3/30/2021  8:45 AM [facility]

## 2021-03-11 ENCOUNTER — HOSPITAL ENCOUNTER (OUTPATIENT)
Dept: PHYSICAL THERAPY | Age: 76
Discharge: HOME | End: 2021-03-11
Payer: MEDICARE

## 2021-03-11 PROCEDURE — 97110 THERAPEUTIC EXERCISES: CPT

## 2021-03-11 PROCEDURE — 97014 ELECTRIC STIMULATION THERAPY: CPT

## 2021-03-11 PROCEDURE — 97140 MANUAL THERAPY 1/> REGIONS: CPT

## 2021-03-11 NOTE — PROGRESS NOTES
PHYSICAL THERAPY - DAILY TREATMENT NOTE    Patient Name: Luis Felipe Otero        Date: 3/11/2021  : 1945   YES Patient  Verified  Visit #:     Insurance: Payor: Kelsie Valentine / Plan: [place] MEDICARE PART A & B / Product Type: Medicare /      In time: 8:48 Out time: 9:48   Total Treatment Time: 60     Medicare/BCBS Time Tracking (below)   Total Timed Codes (min):  60 1:1 Treatment Time:  45     TREATMENT AREA = Left knee pain [M25.562]  Status post left knee surgery [Z98.890]    SUBJECTIVE    Pain Level (on 0 to 10 scale):  3-4 \"sore\"  / 10   Medication Changes/New allergies or changes in medical history, any new surgeries or procedures? NO    If yes, update Summary List   Subjective Functional Status/Changes:  []  No changes reported     \"I had an experience with the PA yesterday. She accused me of not doing enough, and measured me at 95 degrees. She said I needed to be taking my medicine because I'm only taking one a day since I'm not in any pain. They want me to get a brace. And here I was thinking I was doing pretty good and thinking I could get on a bike again.  \"          OBJECTIVE     Therapeutic Procedures:  Min Procedure Specifics + Rationale   n/a [x]  Patient Education (performed throughout session) [x] Review HEP    [] Progressed/Changed HEP based on:   [] proper performance and advancement of Therex/TA   [] reduction in pain level    [] increased functional capacity       [] change in directional preference   30 [x] Therapeutic Exercise   [x]  See Flowsheet   Rationale: increase ROM and increase strength to improve the patients ability to participate in ADL's    15 [x]  Manual Therapy       [] IASTM -   [x] Involved Knee Patellar G2-G4 mobs inferior/medial, STM to quads/HS/ITB interface, Ant/Post G3-G4 Tibiofemoral glides, PROM flexion/Ext contract/relax  Rationale: decrease pain, increase ROM, increase tissue extensibility, decrease trigger points and increase postural awareness to attain functional use and participation with ADL's  [x] Skin assessment post-treatment:  [x]intact [x]redness- no adverse reaction   []redness  adverse  The manual therapy interventions were performed at a separate and distinct time from the therapeutic activities interventions. Modality rationale: decrease inflammation, decrease pain, increase tissue extensibility and increase muscle contraction/control to improve the patients ability to perform ADL's with greater ease     Min Type Additional Details   15 [x] E-Stim Type:IFC  Attended? NO Location: Knee  [] supine              [] prone  [] legs elevated   [] legs flat  []w/heat  []w/ice  [] sitting   [] Vasopneumatic Device    [x] Skin assessment post-treatment:  [x]intact [x]redness- no adverse reaction       []redness  adverse reaction:     Other Objective/Functional Measures:    AROM = 7-103 degrees  PROM = 5-107 degrees     Post Treatment Pain Level (on 0 to 10) scale:   2  / 10     ASSESSMENT    Assessment/Changes in Function:       Able to attain significant increase in ROM after manual         Patient will continue to benefit from skilled PT services to modify and progress therapeutic interventions, address functional mobility deficits, address ROM deficits, address strength deficits, analyze and address soft tissue restrictions, analyze and cue movement patterns, analyze and modify body mechanics/ergonomics and instruct in home and community integration  to attain remaining goals   Progress toward goals / Updated goals:    Progressing in function     PLAN    [x]  Upgrade activities as tolerated  [x]  Update interventions per flow sheet YES Continue plan of care   []  Discharge due to :    []  Other:      Therapist: Lex Gonzalez \"BJ\" Kimberly Apple, JEFFT, Cert. MDT, Cert. DN, Cert. SMT, Dip.  Osteopractic    Date: 3/11/2021 Time: 8:48 AM     Future Appointments   Date Time Provider Jomar Henson   3/12/2021  9:30 AM Jennifer Rocha [address] 3/16/2021  8:45 AM Eric Wills [facility]   3/18/2021  8:45 AM Marlin Dia, PT [facility]   3/25/2021  8:45 AM Marlin Dia PT [facility]   3/30/2021  8:45 AM [facility]

## 2021-03-16 ENCOUNTER — HOSPITAL ENCOUNTER (OUTPATIENT)
Dept: PHYSICAL THERAPY | Age: 76
Discharge: HOME | End: 2021-03-16
Payer: MEDICARE

## 2021-03-16 PROCEDURE — 97140 MANUAL THERAPY 1/> REGIONS: CPT

## 2021-03-16 PROCEDURE — 97535 SELF CARE MNGMENT TRAINING: CPT

## 2021-03-16 NOTE — PROGRESS NOTES
PHYSICAL THERAPY - DAILY TREATMENT NOTE    Patient Name: Len Jernigan        Date: 3/16/2021  : 1945   YES Patient  Verified  Visit #:     Insurance: Payor: Kathy Gutierrez / Plan: [place] MEDICARE PART A & B / Product Type: Medicare /      In time: 8:45 Out time: 9:30   Total Treatment Time: 45     Medicare/BCBS Time Tracking (below)   Total Timed Codes (min):  45 1:1 Treatment Time:  45     TREATMENT AREA =  Left knee pain [M25.562]  Status post left knee surgery [Z98.890]    SUBJECTIVE    Pain Level (on 0 to 10 scale):  3-4  / 10   Medication Changes/New allergies or changes in medical history, any new surgeries or procedures? NO     If yes, update Summary List   Subjective Functional Status/Changes:  []  No changes reported     \"I got the riot act with the PA. I'm using the dynasplint for an hour twice a day. The prednisone has really helped the swelling. \"          OBJECTIVE    Modalities Rationale: To improve activity tolerance for exercise performance and ADL's.    min [] Estim, type/location:                                      []  att     []  unatt     []  w/US     []  w/ice    []  w/heat    min []  Mechanical Traction: type/lbs                   []  pro   []  sup   []  int   []  cont    []  before manual    []  after manual    min []  Ultrasound, settings/location:      min []  Iontophoresis w/ dexamethasone, location:                                               []  take home patch       []  in clinic   NA min []  Ice     []  Heat    location/position:     min []  Vasopneumatic Device, press/temp:     min []  Other:    [x] Skin assessment post-treatment (if applicable):   [x]  intact    []  redness- no adverse reaction     []redness  adverse reaction:    5 min Therapeutic Exercise:  [x]  See flow sheet   Rationale:      increase ROM and increase strength to improve the patients ability to improve knee flexion AROM.      30 min Manual Therapy: manual lymph drainage   Rationale: decrease pain, increase ROM, increase tissue extensibility and decrease edema  to improve patient's ability to ambulate with decreased LE heaviness. ?he manual therapy interventions were performed at a separate and distinct time from the therapeutic activities interventions.     10 min Self-Care: Girth measures, Garment fitting and order facilitation, garment donning and doffing   Rationale: To decrease excess LE weight and improve neuromuscular conduction for quadriceps strength.       min Patient Education:  YES  Reviewed HEP   []  Progressed/Changed HEP based on:   Patient reports compliance     Other Objective/Functional Measures:    Pt demonstrates up to 113 degrees knee flexion AROM following manual lymph draiange. Pt was fitted for a size large thigh-high compression garment. Post Treatment Pain Level (on 0 to 10) scale:   0  / 10     ASSESSMENT    Assessment/Changes in Function:     WIll resume therapeutic exercise routine next visit. Addressed grade II post-op lymphedema to improve AROM and neuromuscular control. WIll continue to progress strengthening/AROM per activity tolerance. []  See Progress Note/Recertification   Patient will continue to benefit from skilled PT services to modify and progress therapeutic interventions, address functional mobility deficits, address ROM deficits, address strength deficits, analyze and address soft tissue restrictions, analyze and cue movement patterns, analyze and modify body mechanics/ergonomics and assess and modify postural abnormalities to attain remaining goals. Progress toward goals / Updated goals: Addressed AROM with manual lymph drainage.      PLAN    [x]  Upgrade activities as tolerated YES Continue plan of care   []  Discharge due to :    []  Other:      Therapist: Jose Guadalupe Doyle    Date: 3/16/2021 Time: 4:24 PM     Future Appointments   Date Time Provider Jomar Henson   3/18/2021  8:45 AM Wilma Son PT [facility]   3/25/2021  8:45 AM Jazmine Palma, PT [facility]   3/26/2021  8:00 AM Henry Apple [facility]   3/30/2021  8:45 AM Jazmine Palma, PT [facility]

## 2021-03-18 ENCOUNTER — HOSPITAL ENCOUNTER (OUTPATIENT)
Dept: PHYSICAL THERAPY | Age: 76
Discharge: HOME | End: 2021-03-18
Payer: MEDICARE

## 2021-03-18 PROCEDURE — 97530 THERAPEUTIC ACTIVITIES: CPT

## 2021-03-18 PROCEDURE — 97014 ELECTRIC STIMULATION THERAPY: CPT

## 2021-03-18 PROCEDURE — 97110 THERAPEUTIC EXERCISES: CPT

## 2021-03-18 NOTE — PROGRESS NOTES
PHYSICAL THERAPY - DAILY TREATMENT NOTE    Patient Name: Sam Shaw        Date: 3/18/2021  : 1945   YES Patient  Verified  Visit #:     Insurance: Payor: Mary Aguayo / Plan: [place] MEDICARE PART A & B / Product Type: Medicare /      In time: 8:43 Out time: 9:51   Total Treatment Time: 68     Medicare/BCBS Time Tracking (below)   Total Timed Codes (min):  68 1:1 Treatment Time:  53     TREATMENT AREA = Left knee pain [M25.562]  Status post left knee surgery [Z98.890]    SUBJECTIVE    Pain Level (on 0 to 10 scale):  1  / 10   Medication Changes/New allergies or changes in medical history, any new surgeries or procedures? NO    If yes, update Summary List   Subjective Functional Status/Changes:  []  No changes reported     Ezjohnny Fragmin was very informative and I was really impressed with how it all worked out. I've been doing that lymph massage. The people for the brace measured me and said I had to get in it for an hour twice a day. \"          OBJECTIVE     Therapeutic Procedures:  Min Procedure Specifics + Rationale   n/a [x]  Patient Education (performed throughout session) [x] Review HEP    [] Progressed/Changed HEP based on:   [] proper performance and advancement of Therex/TA   [] reduction in pain level    [] increased functional capacity       [] change in directional preference   23 [x] Therapeutic Exercise   [x]  See Flowsheet   Rationale: increase ROM and increase strength to improve the patients ability to participate in ADL's    30 [x] Therapeutic Activity   [x]  See Flowsheet  Dynamic Gait HK/Retro/Sidestepping  BOSU Squats  BOSU front foot elevated Static Lunge  BOSU Static Split Squat     Rationale:  To improve safety, proprioception, coordination, and efficiency with tasks     Modality rationale: decrease inflammation, decrease pain, increase tissue extensibility and increase muscle contraction/control to improve the patients ability to perform ADL's with greater ease     Min Type Additional Details   15 [x] E-Stim Type:IFC  Attended? NO Location: Left Knee  [] supine              [] prone  [] legs elevated   [] legs flat  []w/heat  []w/ice  [] sitting   [] Vasopneumatic Device    [x] Skin assessment post-treatment:  [x]intact [x]redness- no adverse reaction       []redness  adverse reaction:     Other Objective/Functional Measures:    Girth (cm):  Right LE  3/18/21 Left LE  3/18/21   MTP: 21 21   Midfoot/navicular: 25 24.5   Ankle: 23 21.5   Calf:      (28 cm from ankle) 39.5 39   Knee:    (2 cm from popliteus 38.5 37   Thigh     (8 cm fron knee) 43.5 41.5   Groin: 65 56.5        Post Treatment Pain Level (on 0 to 10) scale:   0  / 10     ASSESSMENT    Assessment/Changes in Function:       Notable Tibialis weakness         Patient will continue to benefit from skilled PT services to modify and progress therapeutic interventions, address functional mobility deficits, address ROM deficits, address strength deficits, analyze and address soft tissue restrictions, analyze and cue movement patterns, analyze and modify body mechanics/ergonomics and instruct in home and community integration  to attain remaining goals   Progress toward goals / Updated goals:    See PN     PLAN    [x]  Upgrade activities as tolerated  [x]  Update interventions per flow sheet YES Continue plan of care   []  Discharge due to :    []  Other:      Therapist: Maynor Piper \"BJ\" Miky Akhtar, DPT, Cert. MDT, Cert. DN, Cert. SMT, Dip.  Osteopractic    Date: 3/18/2021 Time: 8:57 AM     Future Appointments   Date Time Provider Jomar Henson   3/25/2021  8:45 AM Althea Henry, PT [facility]   3/26/2021  8:00 AM Elaina Meeks [facility]   3/30/2021  8:45 AM Althea Henry, PT [facility]

## 2021-03-18 NOTE — PROGRESS NOTES
Andrew Elizabeth 31  [facility] PHYSICAL THERAPY  [address] [facility] [place], [address] - Phone: [phone]  Fax: [phone]  PROGRESS NOTE  Patient Name: Joe Pyle : [birth year]   Treatment/Medical Diagnosis: Left knee pain [M25.562]  Status post left knee surgery [Z98.890]   Referral Source: Dennis Boeck     Date of Initial Visit: 2/15/21 Attended Visits: 9 Missed Visits: 1     SUMMARY OF TREATMENT  Patient's POC has consisted of therex, therapeutic activities, manual therapy prn, modalities prn, pt. education, and a comprehensive HEP. Treatment strategies used to address functional mobility deficits, ROM deficits, strength deficits, analyze and address soft tissue restrictions, analyze and cue movement patterns, analyze and modify body mechanics/ergonomics, assess and modify postural abnormalities and instruct in home and community integration. CURRENT STATUS  Patient's pain level averaging 1/10, improving in standing and walking tolerance (1 mile). ROM limitation apparently limited primarily due to lymphatic swelling as initial ROM pre-manual lymphatic drainage 7-103 degrees (measured supine). Post lymphatic manual therapy yielded AROM 5-113 degrees. Girth measurements are as follows pre-lymphatic manual:  Girth (cm):  Right LE  3/18/21 Left LE  3/18/21   MTP: 21 21   Midfoot/navicular: 25 24.5   Ankle: 23 21.5   Calf:      (28 cm from ankle) 39.5 39   Knee:    (2 cm from popliteus 38.5 37   Thigh     (8 cm fron knee) 43.5 41.5   Groin: 65 56.5           Goal/Measure of Progress Goal Met? · Short Term Goals: To be accomplished in  3-4  weeks:  1. Patient to be adherent to HEP to facilitate pain control with ADL's.  2. Patient to demonstrate left knee PROM > 110 degrees flexion to facilitate ease in donning/doffing. 3. Patient to demonstrate left knee PROM extension to < 3 degrees to facilitate a more normalized gait. · Long Term Goals:  To be accomplished in  6-8  weeks:  1. Patient to be Safe and Independent with HEP to self-manage/prevent symptoms after DC. 2. Patient to increase FS FOTO score to > 60 to indicate increased functional independence. 3. Patient to demonstrate safe and independent floor to stand transfers to allow him to resume gardening     MET      MET    NO               · Long Term Goals: To be accomplished in  4  weeks:  1. Patient to be Safe and Independent with HEP to self-manage/prevent symptoms after DC. 2. Patient to increase FS FOTO score to > 60 to indicate increased functional independence. 3. Patient to demonstrate safe and independent floor to stand transfers to allow him to resume gardening. Frequency / Duration:   Patient to be seen  2  times per week for 4  weeks:    RECOMMENDATIONS  Continue and progress functional therex/therapeutic activity as able, utilizing manual therapy and modalities prn. Progress patient towards independent HEP to facilitate self-management of symptoms and progress gains after PT. Patient would benefit from attaining compression sleeves to address lymphedema. If you have any questions/comments please contact us directly at [phone]. Thank you for allowing us to assist in the care of your patient. Therapist Signature: Luis Cervantes \"GERARDO\" Jazmin Leader, DPT, Cert. MDT, Cert. DN, Cert. SMT, Dip. Osteopractic Date: 5/43/1663   Certification Period: 2/15/21-5/14/21     Reporting Period 2/15/21-3/18/21   Time: 9:39 AM   NOTE TO PHYSICIAN:  PLEASE COMPLETE THE ORDERS BELOW AND FAX TO   [facility] Physical Therapy: [phone].   If you are unable to process this request in 24 hours please contact our office: [phone].    ___ I have read the above report and request that my patient continue as recommended.   ___ I have read the above report and request that my patient continue therapy with the following changes/special instructions:_________________________________________________________   ___ I have read the above report and request that my patient be discharged from therapy.      Physician Signature:        Date:       Time:                                        Dallin Wang PA-C

## 2021-03-23 ENCOUNTER — APPOINTMENT (OUTPATIENT)
Dept: PHYSICAL THERAPY | Age: 76
End: 2021-03-23
Payer: MEDICARE

## 2021-03-25 ENCOUNTER — HOSPITAL ENCOUNTER (OUTPATIENT)
Dept: PHYSICAL THERAPY | Age: 76
Discharge: HOME | End: 2021-03-25
Payer: MEDICARE

## 2021-03-25 PROCEDURE — 97140 MANUAL THERAPY 1/> REGIONS: CPT

## 2021-03-25 PROCEDURE — 97110 THERAPEUTIC EXERCISES: CPT

## 2021-03-25 NOTE — PROGRESS NOTES
PHYSICAL THERAPY - DAILY TREATMENT NOTE    Patient Name: Santos Downs        Date: 3/25/2021  : 1945   YES Patient  Verified  Visit #:   10   of   12-18  Insurance: Payor: Aguilar Morfin / Plan: [place] MED[place] PART [place] & B / Product Type: Medicare /      In time: 8:43 Out time: 9:23   Total Treatment Time: 40     Medicare/BCBS Time Tracking (below)   Total Timed Codes (min):  40 1:1 Treatment Time:  40     TREATMENT AREA = Left knee pain [M25.562]  Status post left knee surgery [Z98.890]    SUBJECTIVE    Pain Level (on 0 to 10 scale):  1  / 10   Medication Changes/New allergies or changes in medical history, any new surgeries or procedures? NO    If yes, update Summary List   Subjective Functional Status/Changes:  []  No changes reported     \"I had gone to [place] this past week. There was traffic everywhere. [place]. In [place] they were doing maintenance work and there was 10 miles of back up. We're planning to move to [place] next year and were picking out our alf home. I've been wearing the brace the people got me and it doesn't feel like it's really bending me much.  \"          OBJECTIVE     Therapeutic Procedures:  Min Procedure Specifics + Rationale   n/a [x]  Patient Education (performed throughout session) [x] Review HEP    [] Progressed/Changed HEP based on:   [] proper performance and advancement of Therex/TA   [] reduction in pain level    [] increased functional capacity       [] change in directional preference   25 [x] Therapeutic Exercise   [x]  See Flowsheet   Rationale: increase ROM and increase strength to improve the patients ability to participate in ADL's    15 [x]  Manual Therapy       [x] IASTM - FRAM to anterior aspect of knee  [x] Involved Knee Patellar G2-G4 mobs inferior/medial, STM to quads/HS/ITB interface, Ant/Post G3-G4 Tibiofemoral glides, PROM flexion/Ext contract/relax  Rationale: decrease pain, increase ROM, increase tissue extensibility, decrease trigger points and increase postural awareness to attain functional use and participation with ADL's  [x] Skin assessment post-treatment:  [x]intact [x]redness- no adverse reaction   []redness  adverse  The manual therapy interventions were performed at a separate and distinct time from the therapeutic activities interventions. Other Objective/Functional Measures:    AROM flexion = 111 degrees     Post Treatment Pain Level (on 0 to 10) scale:   0  / 10     ASSESSMENT    Assessment/Changes in Function:       Improving in flexion         Patient will continue to benefit from skilled PT services to modify and progress therapeutic interventions, address functional mobility deficits, address ROM deficits, address strength deficits, analyze and address soft tissue restrictions, analyze and cue movement patterns, analyze and modify body mechanics/ergonomics and instruct in home and community integration  to attain remaining goals   Progress toward goals / Updated goals:    Performing HEP      PLAN    [x]  Upgrade activities as tolerated  [x]  Update interventions per flow sheet YES Continue plan of care   []  Discharge due to :    []  Other:      Therapist: Maynor Piper \"BJ\" Miky Akhtar, DPT, Cert. MDT, Cert. DN, Cert. SMT, Dip.  Osteopractic    Date: 3/25/2021 Time: 9:09 AM     Future Appointments   Date Time Provider Jomar Henson   3/26/2021  8:00 AM Mosetta Paling [facility]   3/30/2021  8:45 AM Rome Camera, PT [facility]   4/1/2021  8:45 AM [facility]   4/6/2021  8:45 AM Rome Camera, PT [facility]   4/8/2021  8:45 AM Rome Camera, PT [facility]   4/13/2021  8:45 AM Rome Camera, PT [facility]   4/15/2021  8:45 AM Rome Camera, PT [facility]   4/20/2021  8:45 AM Rome Camera, PT [facility]   4/22/2021  8:45 AM Rome Camera, PT [facility]   4/27/2021  8:45 AM Rome Camera, PT [facility]   4/29/2021  8:45 AM Rome Camera, PT [facility]

## 2021-03-26 ENCOUNTER — HOSPITAL ENCOUNTER (OUTPATIENT)
Dept: PHYSICAL THERAPY | Age: 76
Discharge: HOME | End: 2021-03-26
Payer: MEDICARE

## 2021-03-26 PROCEDURE — 97112 NEUROMUSCULAR REEDUCATION: CPT

## 2021-03-26 PROCEDURE — 97530 THERAPEUTIC ACTIVITIES: CPT

## 2021-03-26 PROCEDURE — 97110 THERAPEUTIC EXERCISES: CPT

## 2021-03-26 NOTE — PROGRESS NOTES
PHYSICAL THERAPY - DAILY TREATMENT NOTE    Patient Name: Estela Primrose        Date: 3/26/2021  : 1945   YES Patient  Verified  Visit #:     Insurance: Payor: Elena Tapia / Plan: [place] MEDICARE PART A & B / Product Type: Medicare /      In time: 8:05 Out time: 8:45   Total Treatment Time: 40     Medicare/BCBS Time Tracking (below)   Total Timed Codes (min):  40 1:1 Treatment Time:  40     TREATMENT AREA =  Left knee pain [M25.562]  Status post left knee surgery [Z98.890]    SUBJECTIVE    Pain Level (on 0 to 10 scale):  0  / 10   Medication Changes/New allergies or changes in medical history, any new surgeries or procedures? NO     If yes, update Summary List   Subjective Functional Status/Changes:  []  No changes reported     \"I actually feel pretty good today. I'm hoping I can get on the bike again soon. The PA was pleased. I had 113 degrees when I was in there and I met their goal.\"          OBJECTIVE    Modalities Rationale:  To improve activity tolerance for exercise performance and ADL's.    min [] Estim, type/location:                                      []  att     []  unatt     []  w/US     []  w/ice    []  w/heat    min []  Mechanical Traction: type/lbs                   []  pro   []  sup   []  int   []  cont    []  before manual    []  after manual    min []  Ultrasound, settings/location:      min []  Iontophoresis w/ dexamethasone, location:                                               []  take home patch       []  in clinic   NA min []  Ice     []  Heat    location/position:     min []  Vasopneumatic Device, press/temp:     min []  Other:    [x] Skin assessment post-treatment (if applicable):   [x]  intact    []  redness- no adverse reaction     []redness  adverse reaction:    10 min Therapeutic Exercise:  [x]  See flow sheet   Rationale:      increase ROM and increase strength to improve the patients ability to perform     25 min Therapeutic Activity: Mini lunge, sidestepping, squats, goblet squats, stair negotiation   Rationale: To increase safety and efficiency with ADL's. 10 min Neuromuscular Reeducation BOSU step-up's, sharpened rhomberg   Rationale: To improve postural awareness and eccentric control with relative SLS ADL's and ADL's on dynamic surfaces. min Patient Education:  YES  Reviewed HEP   []  Progressed/Changed HEP based on:   Patient reports compliance     Other Objective/Functional Measures:    Pt demonstrates up to 115 degrees knee flexion AROM. Initiated more functional exercises including mini-lunges. Pt needs cues with mini-lunges to perform without forward weight-shifting. BOSU step-up's reveal instability and the foot/ankle. Addressed with sharpened rhomberg EO and modified SR EC. Will add airex hip 3-way to address noted deficits next visit. See flowsheet for more details. Post Treatment Pain Level (on 0 to 10) scale:   0  / 10     ASSESSMENT    Assessment/Changes in Function:     WIll continue to progress strengthening/AROM per activity tolerance. []  See Progress Note/Recertification   Patient will continue to benefit from skilled PT services to modify and progress therapeutic interventions, address functional mobility deficits, address ROM deficits, address strength deficits, analyze and address soft tissue restrictions, analyze and cue movement patterns, analyze and modify body mechanics/ergonomics and assess and modify postural abnormalities to attain remaining goals. Progress toward goals / Updated goals: Addressed FOTO with mini-lunges, squats, stair negotiation and SLS drills.      PLAN    [x]  Upgrade activities as tolerated YES Continue plan of care   []  Discharge due to :    []  Other:      Therapist: Leticia Mccann    Date: 3/26/2021 Time: 11:18 AM     Future Appointments   Date Time Provider Jomar Henson   3/30/2021  8:45 AM Reynold Gant PT [facility]   4/1/2021  8:45 AM Raven VARGAS [facility]   4/6/2021  8:45 AM Jazmine Palma, PT [facility]   4/8/2021  8:45 AM Jazmine Palma, PT [facility]   4/13/2021  8:45 AM Jazmine Palma, PT [facility]   4/15/2021  8:45 AM Jazmine Palam, PT [facility]   4/20/2021  8:45 AM Jazmine Palma, PT [facility]   4/22/2021  8:45 AM Jazmine Palma, PT [facility]   4/27/2021  8:45 AM Jazmine Palma, PT [facility]   4/29/2021  8:45 AM Jazmine Palma, PT [facility]

## 2021-03-30 ENCOUNTER — HOSPITAL ENCOUNTER (OUTPATIENT)
Dept: PHYSICAL THERAPY | Age: 76
Discharge: HOME | End: 2021-03-30
Payer: MEDICARE

## 2021-03-30 PROCEDURE — 97530 THERAPEUTIC ACTIVITIES: CPT

## 2021-03-30 PROCEDURE — 97016 VASOPNEUMATIC DEVICE THERAPY: CPT

## 2021-03-30 PROCEDURE — 97110 THERAPEUTIC EXERCISES: CPT

## 2021-03-30 PROCEDURE — 97112 NEUROMUSCULAR REEDUCATION: CPT

## 2021-03-30 NOTE — PROGRESS NOTES
PHYSICAL THERAPY - DAILY TREATMENT NOTE    Patient Name: Rosaura Oakes        Date: 3/30/2021  : 1945   YES Patient  Verified  Visit #:     Insurance: Payor: Julian Olivera / Plan: [place] MEDICARE PART A & B / Product Type: Medicare /      In time: 8:40 Out time: 9:35   Total Treatment Time: 55     Medicare/BCBS Time Tracking (below)   Total Timed Codes (min):  55 1:1 Treatment Time:  40     TREATMENT AREA = Left knee pain [M25.562]  Status post left knee surgery [Z98.890]    SUBJECTIVE    Pain Level (on 0 to 10 scale):  0  / 10   Medication Changes/New allergies or changes in medical history, any new surgeries or procedures? NO    If yes, update Summary List   Subjective Functional Status/Changes:  []  No changes reported     \"The doctor was very pleased. I was able to get on a bike and ride about 2 miles. I don't really have much pain right now but I have something else going on. Ever since the other day when I was doing a (prone knee flexion stretch) I've started having some numbness and pins/needles down my right arm to my hand. \"          OBJECTIVE     Therapeutic Procedures:  Min Procedure Specifics + Rationale   n/a [x]  Patient Education (performed throughout session) [x] Review HEP    [] Progressed/Changed HEP based on:   [] proper performance and advancement of Therex/TA   [] reduction in pain level    [] increased functional capacity       [] change in directional preference   15 [x] Therapeutic Exercise   [x]  See Flowsheet   Rationale: increase ROM and increase strength to improve the patients ability to participate in ADL's    15 [x] Therapeutic Activity   [x]  See Flowsheet  Rationale:  To improve safety, proprioception, coordination, and efficiency with tasks   10 [x] Neuromuscular Re-ed   [x]  See Flowsheet  BOSU  MSR  Rationale: increase ROM, increase strength, improve coordination, improve balance and increase proprioception  to improve the patients ability to safely participate in ADL's     Modality rationale: decrease inflammation, decrease pain, increase tissue extensibility and increase muscle contraction/control to improve the patients ability to perform ADL's with greater ease     Min Type Additional Details   15 [x]  Vasopneumatic Device  [] pre-KAILEY      [x] post-KAILEY  []  Ice massage Location Left Knee  [] supine              [] prone  [] legs elevated    [] legs flat   [] sitting   [x] Skin assessment post-treatment:  [x]intact [x]redness- no adverse reaction       []redness  adverse reaction:     Other Objective/Functional Measures:    Assessed for C/S component for right UE paraesthesia. Post Treatment Pain Level (on 0 to 10) scale:   0  / 10     ASSESSMENT    Assessment/Changes in Function:       C/S derangement to right UE centralized and alleviated by repeated movements per MDT. Patient will continue to benefit from skilled PT services to modify and progress therapeutic interventions, address functional mobility deficits, address ROM deficits, address strength deficits, analyze and address soft tissue restrictions, analyze and cue movement patterns, analyze and modify body mechanics/ergonomics and instruct in home and community integration  to attain remaining goals   Progress toward goals / Updated goals:    Continued improvement in mobility and return to PLOF     PLAN    [x]  Upgrade activities as tolerated  [x]  Update interventions per flow sheet YES Continue plan of care   []  Discharge due to :    []  Other:      Therapist: Dustin Rajan \"BJ\" MARY Weiner, [address]. MDT, Cert. DN, Cert. SMT, Dip.  Osteopractic    Date: 3/30/2021 Time: 8:46 AM     Future Appointments   Date Time Provider Jomar Henson   4/1/2021  8:45 AM Bjorn Chaney [facility]   4/6/2021  8:45 AM Laura Oviedo PT [facility]   4/8/2021  8:45 AM Laura Oviedo PT [facility]   4/13/2021  8:45 AM Laura Oviedo PT [facility]   4/15/2021  8:45 AM Laura Oviedo PT [facility]   4/20/2021 8:45 AM Miko Denney, PT [facility]   4/22/2021  8:45 AM Miko Denney, PT [facility]   4/27/2021  8:45 AM Miko Denney, PT [facility]   4/29/2021  8:45 AM [facility]

## 2021-04-01 ENCOUNTER — HOSPITAL ENCOUNTER (OUTPATIENT)
Dept: PHYSICAL THERAPY | Age: 76
Discharge: HOME | End: 2021-04-01
Payer: MEDICARE

## 2021-04-01 PROCEDURE — 97530 THERAPEUTIC ACTIVITIES: CPT

## 2021-04-01 PROCEDURE — 97110 THERAPEUTIC EXERCISES: CPT

## 2021-04-01 PROCEDURE — 97140 MANUAL THERAPY 1/> REGIONS: CPT

## 2021-04-01 NOTE — PROGRESS NOTES
PHYSICAL THERAPY - DAILY TREATMENT NOTE    Patient Name: Cta Sanchez        Date: 2021  : 1945   YES Patient  Verified  Visit #:   15   of   18  Insurance: Payor: Son Blanco / Plan: [place] MEDICARE PART A & B / Product Type: Medicare /      In time: 8:43 Out time: 9:46   Total Treatment Time: 63     Medicare/BCBS Time Tracking (below)   Total Timed Codes (min):  53 1:1 Treatment Time:  53     TREATMENT AREA =  Left knee pain [M25.562]  Status post left knee surgery [Z98.890]    SUBJECTIVE    Pain Level (on 0 to 10 scale):  4  / 10   Medication Changes/New allergies or changes in medical history, any new surgeries or procedures? NO     If yes, update Summary List   Subjective Functional Status/Changes:  []  No changes reported     \"My neck hurts a little more, but my leg is fine. \"          OBJECTIVE    Modalities Rationale: To improve activity tolerance for exercise performance and ADL's.    min [] Estim, type/location:                                      []  att     []  unatt     []  w/US     []  w/ice    []  w/heat    min []  Mechanical Traction: type/lbs                   []  pro   []  sup   []  int   []  cont    []  before manual    []  after manual    min []  Ultrasound, settings/location:      min []  Iontophoresis w/ dexamethasone, location:                                               []  take home patch       []  in clinic   NA min []  Ice     []  Heat    location/position:     min []  Vasopneumatic Device, press/temp:     min []  Other:    [x] Skin assessment post-treatment (if applicable):   [x]  intact    []  redness- no adverse reaction     []redness  adverse reaction:    20 min Therapeutic Exercise:  [x]  See flow sheet   Rationale:      increase ROM and increase strength to improve the patients ability to perform ADL's with improved hip girdle and quadriceps stability. 25 min Therapeutic Activity: Squats, sidestepping, tandem stance   Rationale:    To increase safety and efficiency with ADL's.    8 min Manual Therapy: manual lymph drainage    Rationale:      decrease pain, increase ROM, increase tissue extensibility and decrease edema  to improve patient's ability to ambulate with decreased LE heaviness. The manual therapy interventions were performed at a separate and distinct time from the therapeutic activities interventions. min Patient Education:  YES  Reviewed HEP   []  Progressed/Changed HEP based on:   Patient reports compliance     Other Objective/Functional Measures:    Pt's knee-high compression socks increase fluid retention proximal to the end of the compression sock surrounding the knee. Due to increased edema, pt demonstrates -10 degrees knee extension AROM. Addressed with manual lymph drainage and education on appropriate compression garment height. Pt requires cues with squats to perform without a forward head and forward-flexed spine. See flowsheet for more details on additional exercise progressions. Post Treatment Pain Level (on 0 to 10) scale:   2  / 10     ASSESSMENT    Assessment/Changes in Function:     WIll continue to progress strengthening/AROM per activity tolerance. []  See Progress Note/Recertification   Patient will continue to benefit from skilled PT services to modify and progress therapeutic interventions, address functional mobility deficits, address ROM deficits, address strength deficits, analyze and address soft tissue restrictions, analyze and cue movement patterns, analyze and modify body mechanics/ergonomics and assess and modify postural abnormalities to attain remaining goals. Progress toward goals / Updated goals:    1. Patient to be Safe and Independent with HEP to self-manage/prevent symptoms after DC. 2. Patient to increase FS FOTO score to > 60 to indicate increased functional independence. Addressed with squats and step-up progressions.   3. Patient to demonstrate safe and independent floor to stand transfers to allow him to resume gardening. Addressed with mini-lunges.      PLAN    [x]  Upgrade activities as tolerated YES Continue plan of care   []  Discharge due to :    []  Other:      Therapist: Lisbeth Muñiz    Date: 4/1/2021 Time: 8:45 AM     Future Appointments   Date Time Provider Jomar Henson   4/6/2021  8:45 AM Reagan Antonio, PT [facility]   4/8/2021  8:45 AM Reagan Antonio, PT [facility]   4/13/2021  8:45 AM Reaganpeña Antonio, PT [facility]   4/15/2021  8:45 AM Reaganpeña Antonio, PT [facility]   4/20/2021  8:45 AM Reaganpeña Antonio, PT [facility]   4/22/2021  8:45 AM Reaganpeña Antonio, PT [facility]   4/27/2021  8:45 AM Reagan Antonio, PT [facility]   4/29/2021  8:45 AM [facility]

## 2021-04-06 ENCOUNTER — HOSPITAL ENCOUNTER (OUTPATIENT)
Dept: PHYSICAL THERAPY | Age: 76
Discharge: HOME | End: 2021-04-06
Payer: MEDICARE

## 2021-04-06 PROCEDURE — 97016 VASOPNEUMATIC DEVICE THERAPY: CPT

## 2021-04-06 PROCEDURE — 97110 THERAPEUTIC EXERCISES: CPT

## 2021-04-06 PROCEDURE — 97530 THERAPEUTIC ACTIVITIES: CPT

## 2021-04-06 NOTE — PROGRESS NOTES
PHYSICAL THERAPY - DAILY TREATMENT NOTE    Patient Name: Aneta Chand        Date: 2021  : 1945   YES Patient  Verified  Visit #:   15   of   18  Insurance: Payor: Uma Mathew / Plan: [place] & B / Product Type: Medicare /      In time: 8:38 Out time: 9:31   Total Treatment Time: 53     Medicare/BCBS Time Tracking (below)   Total Timed Codes (min):  53 1:1 Treatment Time:  38     TREATMENT AREA = Left knee pain [M25.562]  Status post left knee surgery [Z98.890]    SUBJECTIVE    Pain Level (on 0 to 10 scale):  2-3  / 10   Medication Changes/New allergies or changes in medical history, any new surgeries or procedures? NO    If yes, update Summary List   Subjective Functional Status/Changes:  []  No changes reported     \"The neck is a whole lot better, still a little stiff. The knee has some soreness but that's because I was getting my garden ready.  \"          OBJECTIVE     Therapeutic Procedures:  Min Procedure Specifics + Rationale   n/a [x]  Patient Education (performed throughout session) [x] Review HEP    [] Progressed/Changed HEP based on:   [] proper performance and advancement of Therex/TA   [] reduction in pain level    [] increased functional capacity       [] change in directional preference   30 [x] Therapeutic Exercise   [x]  See Flowsheet   Rationale: increase ROM and increase strength to improve the patients ability to participate in ADL's      8 min Therapeutic Activity: Grass ambulation in HK/Retro/Sidestepping   Rationale:    improve safety on uneven ground        Modality rationale: decrease inflammation, decrease pain, increase tissue extensibility and increase muscle contraction/control to improve the patients ability to perform ADL's with greater ease     Min Type Additional Details   15 [x]  Vasopneumatic Device  [] pre-KAILEY      [x] post-KAILEY  []  Ice massage Location:Left knee    [] supine              [] prone  [] legs elevated    [] legs flat   [] sitting   [x] Skin assessment post-treatment:  [x]intact [x]redness- no adverse reaction       []redness  adverse reaction:     Other Objective/Functional Measures:    Increased reps/sets/resistance per flow sheet. Post Treatment Pain Level (on 0 to 10) scale:   1  / 10     ASSESSMENT    Assessment/Changes in Function:       Able to self correct balance on grass         Patient will continue to benefit from skilled PT services to modify and progress therapeutic interventions, address functional mobility deficits, address ROM deficits, address strength deficits, analyze and address soft tissue restrictions, analyze and cue movement patterns, analyze and modify body mechanics/ergonomics and instruct in home and community integration  to attain remaining goals   Progress toward goals / Updated goals:    Progressing in functional balance     PLAN    [x]  Upgrade activities as tolerated  [x]  Update interventions per flow sheet YES Continue plan of care   []  Discharge due to :    []  Other:      Therapist: Rachel Dockery \"BJ\" Isaak Tapia, DPT, Cert. MDT, Cert. DN, Cert. SMT, Dip.  Osteopractic    Date: 4/6/2021 Time: 8:43 AM     Future Appointments   Date Time Provider Jomar Henson   4/6/2021  8:45 AM Danita Ann, PT [facility]   4/8/2021  8:45 AM Danita Ann, PT [facility]   4/13/2021  8:45 AM Danita Ann, PT [facility]   4/15/2021  8:45 AM Danita Ann, PT [facility]   4/20/2021  8:45 AM Danita Ann, PT [facility]   4/22/2021  8:45 AM Danita Ann, PT [facility]   4/27/2021  8:45 AM Danita Ann, PT [facility]   4/29/2021  8:45 AM [facility]

## 2021-04-08 ENCOUNTER — HOSPITAL ENCOUNTER (OUTPATIENT)
Dept: PHYSICAL THERAPY | Age: 76
Discharge: HOME | End: 2021-04-08
Payer: MEDICARE

## 2021-04-08 PROCEDURE — 97016 VASOPNEUMATIC DEVICE THERAPY: CPT

## 2021-04-08 PROCEDURE — 97110 THERAPEUTIC EXERCISES: CPT

## 2021-04-08 PROCEDURE — 97530 THERAPEUTIC ACTIVITIES: CPT

## 2021-04-08 NOTE — PROGRESS NOTES
PHYSICAL THERAPY - DAILY TREATMENT NOTE    Patient Name: Berna Santamaria        Date: 2021  : 1945   YES Patient  Verified  Visit #:   15   of   18  Insurance: Payor: Brad Oden / Plan: [place] MEDICARE PART A & B / Product Type: Medicare /      In time: 8:34 Out time: 9:34   Total Treatment Time: 60     Medicare/BCBS Time Tracking (below)   Total Timed Codes (min):  60 1:1 Treatment Time:  45     TREATMENT AREA = Left knee pain [M25.562]  Status post left knee surgery [Z98.890]    SUBJECTIVE    Pain Level (on 0 to 10 scale):  2  / 10   Medication Changes/New allergies or changes in medical history, any new surgeries or procedures? NO    If yes, update Summary List   Subjective Functional Status/Changes:  []  No changes reported     \"The knee isn't bad \"          OBJECTIVE     Therapeutic Procedures:  Min Procedure Specifics + Rationale   n/a [x]  Patient Education (performed throughout session) [x] Review HEP    [] Progressed/Changed HEP based on:   [] proper performance and advancement of Therex/TA   [] reduction in pain level    [] increased functional capacity       [] change in directional preference   37 [x] Therapeutic Exercise   [x]  See Flowsheet   Rationale: increase ROM and increase strength to improve the patients ability to participate in ADL's    8 [x] Therapeutic Activity   [x]  See Flowsheet  Grass ambulation  HK/Retro/Sidestepping  Rationale:  To improve safety, proprioception, coordination, and efficiency with tasks     Modality rationale: decrease inflammation, decrease pain, increase tissue extensibility and increase muscle contraction/control to improve the patients ability to perform ADL's with greater ease     Min Type Additional Details   15 [x]  Vasopneumatic Device  [] pre-KAILEY      [x] post-KAILEY  []  Ice massage Location:left knee    [x] supine              [] prone  [x] legs elevated    [] legs flat   [] sitting   [x] Skin assessment post-treatment:  [x]intact [x]redness- no adverse reaction       []redness  adverse reaction:     Other Objective/Functional Measures:    Increased reps/sets/resistance per flow sheet. Post Treatment Pain Level (on 0 to 10) scale:   0  / 10     ASSESSMENT    Assessment/Changes in Function:       Improved balance on grass vs last session, indicating reduced risk for falls         Patient will continue to benefit from skilled PT services to modify and progress therapeutic interventions, address functional mobility deficits, address ROM deficits, address strength deficits, analyze and address soft tissue restrictions, analyze and cue movement patterns, analyze and modify body mechanics/ergonomics, assess and modify postural abnormalities, address imbalance/dizziness and instruct in home and community integration  to attain remaining goals   Progress toward goals / Updated goals:    Progressing well in functional mobility. PLAN    [x]  Upgrade activities as tolerated  [x]  Update interventions per flow sheet YES Continue plan of care   []  Discharge due to :    []  Other:      Therapist: Poncho No \"BJ\" Regine, DPT, Cert. MDT, Cert. DN, Cert. SMT, Dip.  Osteopractic    Date: 4/8/2021 Time: 8:45 AM     Future Appointments   Date Time Provider Jomar Henson   4/13/2021  8:45 AM Jared Jiménez, PT [facility]   4/15/2021  8:45 AM Jared Jiménez, PT [facility]   4/20/2021  8:45 AM Jared Jiménez, PT [facility]   4/22/2021  8:45 AM Jared Jiménez PT [facility]   4/27/2021  8:45 AM Jared Jiménez PT [facility]   4/29/2021  8:45 AM Jared Jiménez, PT [facility]

## 2021-04-13 ENCOUNTER — HOSPITAL ENCOUNTER (OUTPATIENT)
Dept: PHYSICAL THERAPY | Age: 76
Discharge: HOME | End: 2021-04-13
Payer: MEDICARE

## 2021-04-13 PROCEDURE — 97110 THERAPEUTIC EXERCISES: CPT

## 2021-04-13 PROCEDURE — 97016 VASOPNEUMATIC DEVICE THERAPY: CPT

## 2021-04-13 PROCEDURE — 97140 MANUAL THERAPY 1/> REGIONS: CPT

## 2021-04-13 NOTE — PROGRESS NOTES
PHYSICAL THERAPY - DAILY TREATMENT NOTE    Patient Name: Asia Scherer        Date: 2021  : 1945   YES Patient  Verified  Visit #:      18+  Insurance: Payor: Gabby Gerber / Plan: [place] MEDICARE PART A & B / Product Type: Medicare /      In time: 8:33 Out time: 9:30   Total Treatment Time: 57     Medicare/BCBS Time Tracking (below)   Total Timed Codes (min):  57 1:1 Treatment Time:  42     TREATMENT AREA = Left knee pain [M25.562]  Status post left knee surgery [Z98.890]    SUBJECTIVE    Pain Level (on 0 to 10 scale):  0  / 10   Medication Changes/New allergies or changes in medical history, any new surgeries or procedures? NO    If yes, update Summary List   Subjective Functional Status/Changes:  []  No changes reported     \"I was a little sore from doing a lot of yard work over the weekend but it's been pretty good.  \"          OBJECTIVE     Therapeutic Procedures:  Min Procedure Specifics + Rationale   n/a [x]  Patient Education (performed throughout session) [x] Review HEP    [] Progressed/Changed HEP based on:   [] proper performance and advancement of Therex/TA   [] reduction in pain level    [] increased functional capacity       [] change in directional preference   32 [x] Therapeutic Exercise   [x]  See Flowsheet   Rationale: increase ROM and increase strength to improve the patients ability to participate in ADL's    10 [x]  Manual Therapy       [] IASTM -   [x] Involved Knee Patellar G2-G4 mobs inferior/medial, STM to quads/HS/ITB interface, Ant/Post G3-G4 Tibiofemoral glides, PROM flexion/Ext contract/relax  Rationale: decrease pain, increase ROM, increase tissue extensibility, decrease trigger points and increase postural awareness to attain functional use and participation with ADL's  [x] Skin assessment post-treatment:  [x]intact [x]redness- no adverse reaction   []redness  adverse  The manual therapy interventions were performed at a separate and distinct time from the therapeutic activities interventions. Modality rationale: decrease inflammation, decrease pain, increase tissue extensibility and increase muscle contraction/control to improve the patients ability to perform ADL's with greater ease     Min Type Additional Details   15 [x]  Vasopneumatic Device  [] pre-KAILEY      [x] post-KAILEY  []  Ice massage Location:LEft Knee    [] supine              [] prone  [] legs elevated    [] legs flat   [] sitting   [x] Skin assessment post-treatment:  [x]intact [x]redness- no adverse reaction       []redness  adverse reaction:     Other Objective/Functional Measures:    AROM 2-112 degrees, PROM 0-114 degrees     Post Treatment Pain Level (on 0 to 10) scale:   0  / 10     ASSESSMENT    Assessment/Changes in Function:       Improving well in ROM         Patient will continue to benefit from skilled PT services to modify and progress therapeutic interventions, address functional mobility deficits, address ROM deficits, address strength deficits, analyze and address soft tissue restrictions, analyze and cue movement patterns, analyze and modify body mechanics/ergonomics, assess and modify postural abnormalities, address imbalance/dizziness and instruct in home and community integration  to attain remaining goals   Progress toward goals / Updated goals:    Progressing to DC     PLAN    [x]  Upgrade activities as tolerated  [x]  Update interventions per flow sheet YES Continue plan of care   []  Discharge due to :    []  Other:      Therapist: Marcie Márquez \"BJ\" Massimo Iniguez DPT, Cert. MDT, Cert. DN, Cert. SMT, Dip.  Osteopractic    Date: 4/13/2021 Time: 8:38 AM     Future Appointments   Date Time Provider Jomar Henson   4/13/2021  8:45 AM Cal Anchors, PT [facility]   4/16/2021 11:00 AM Crompond Anchors, PT [facility]   4/20/2021  8:45 AM Crompond Anchors, PT [facility]   4/22/2021  9:30 AM Jennifer Rocha DO [address]   4/22/2021 11:45 AM Crompond Anchors, PT [facility]   4/27/2021  8:45 AM Regine Checo Singleton [facility]   4/29/2021  8:45 AM Lili Fox, [facility]

## 2021-04-15 ENCOUNTER — APPOINTMENT (OUTPATIENT)
Dept: PHYSICAL THERAPY | Age: 76
End: 2021-04-15
Payer: MEDICARE

## 2021-04-16 ENCOUNTER — HOSPITAL ENCOUNTER (OUTPATIENT)
Dept: PHYSICAL THERAPY | Age: 76
Discharge: HOME | End: 2021-04-16
Payer: MEDICARE

## 2021-04-16 PROCEDURE — 97110 THERAPEUTIC EXERCISES: CPT

## 2021-04-16 PROCEDURE — 97530 THERAPEUTIC ACTIVITIES: CPT

## 2021-04-16 PROCEDURE — 97016 VASOPNEUMATIC DEVICE THERAPY: CPT

## 2021-04-16 NOTE — PROGRESS NOTES
PHYSICAL THERAPY - DAILY TREATMENT NOTE    Patient Name: Hector De La Cruz        Date: 2021  : 1945   YES Patient  Verified  Visit #:      18+  Insurance: Payor: [place] MEDICARE / Plan: [place] MEDICARE PART A & B / Product Type: Medicare /      In time: 10:55 Out time: 11:50   Total Treatment Time: 55     Medicare/BCBS Time Tracking (below)   Total Timed Codes (min):  55 1:1 Treatment Time:  40     TREATMENT AREA = Left knee pain [M25.562]  Status post left knee surgery [Z98.890]    SUBJECTIVE    Pain Level (on 0 to 10 scale):  0  / 10   Medication Changes/New allergies or changes in medical history, any new surgeries or procedures? NO    If yes, update Summary List   Subjective Functional Status/Changes:  []  No changes reported     \"I saw the dragon lady yesterday ([place]). She said I didn't have to wear the brace anymore. \"          OBJECTIVE     Therapeutic Procedures:  Min Procedure Specifics + Rationale   n/a [x]  Patient Education (performed throughout session) [x] Review HEP    [] Progressed/Changed HEP based on:   [] proper performance and advancement of Therex/TA   [] reduction in pain level    [] increased functional capacity       [] change in directional preference   30 [x] Therapeutic Exercise   [x]  See Flowsheet   Rationale: increase ROM and increase strength to improve the patients ability to participate in ADL's    10 [x] Therapeutic Activity   [x]  See Flowsheet  Grass ambulation  Step ups  Lunges    Rationale:  To improve safety, proprioception, coordination, and efficiency with tasks     Modality rationale: decrease inflammation, decrease pain, increase tissue extensibility and increase muscle contraction/control to improve the patients ability to perform ADL's with greater ease     Min Type Additional Details   15 [x]  Vasopneumatic Device  [] pre-KAILEY      [x] post-KAILEY  []  Ice massage Location Left Knee    [] supine              [] prone  [x] legs elevated    [] legs flat   [] sitting   [x] Skin assessment post-treatment:  [x]intact [x]redness- no adverse reaction       []redness  adverse reaction:     Other Objective/Functional Measures:    See PN     Post Treatment Pain Level (on 0 to 10) scale:   0  / 10     ASSESSMENT    Assessment/Changes in Function:       See PN         Patient will continue to benefit from skilled PT services to modify and progress therapeutic interventions, address functional mobility deficits, address ROM deficits, address strength deficits, analyze and address soft tissue restrictions, analyze and cue movement patterns, analyze and modify body mechanics/ergonomics, address imbalance/dizziness and instruct in home and community integration  to attain remaining goals   Progress toward goals / Updated goals:    See PN     PLAN    [x]  Upgrade activities as tolerated  [x]  Update interventions per flow sheet YES Continue plan of care   []  Discharge due to :    []  Other:      Therapist: Winter Shaw \"BJ\" Chasidy Castro, DPT, Cert. MDT, Cert. DN, Cert. SMT, Dip.  Osteopractic    Date: 4/16/2021 Time: 11:20 AM     Future Appointments   Date Time Provider Jomar Henson   4/20/2021  8:45 AM Harsh Romero PT [facility] [address]   4/22/2021  9:30 AM [place], [place] [address]   4/22/2021 11:45 AM Harsh Romero PT [facility] [address]   4/27/2021  8:45 AM Harsh Romero PT [facility] [address]   4/29/2021  8:45 AM [facility] [address]

## 2021-04-16 NOTE — PROGRESS NOTES
[address]  [facility] PHYSICAL THERAPY  [address] [facility] [address], [address] - Phone: [phone]  Fax: [phone]  PROGRESS NOTE  Patient Name: Gita Birmingham :    Treatment/Medical Diagnosis: Left knee pain [M25.562]  Status post left knee surgery [Z98.890]   Referral Source: Dallastine Beam     Date of Initial Visit: 2/15/21 Attended Visits: 17 Missed Visits: 0     SUMMARY OF TREATMENT  Patient's POC has consisted of therex, therapeutic activities, manual therapy prn, modalities prn, pt. education, and a comprehensive HEP. Treatment strategies used to address functional mobility deficits, ROM deficits, strength deficits, analyze and address soft tissue restrictions, analyze and cue movement patterns, analyze and modify body mechanics/ergonomics, assess and modify postural abnormalities and instruct in home and community integration. CURRENT STATUS  Patient reports resumption of gardening and recreational walking. He denies any significant ADL limitations, and is progressing in his balance on uneven ground. He is independent in donning/doffing his compression stocking and performing lymphatic massage. Patient is able to attain AROM 2-112 degrees, PROM 0-114 degrees after performing lymphatic massage. Goal/Measure of Progress Goal Met? 1. Patient to be Safe and Independent with HEP to self-manage/prevent symptoms after DC. 2. Patient to increase FS FOTO score to > 60 to indicate increased functional independence. 3. Patient to demonstrate safe and independent floor to stand transfers to allow him to resume  Ongoing      Ongoing      MET     New Goals to be achieved in __2__  weeks:  1. Patient to be Safe and Independent with HEP to self-manage/prevent symptoms after DC. 2. Patient to increase FS FOTO score to > 60 to indicate increased functional independence.    Frequency / Duration:   Patient to be seen  2  times per week for 2 weeks:    RECOMMENDATIONS  Finish remaining sessions while progressing balance therapy and transfer activities to continue to promote safety in yard work and household chores. If you have any questions/comments please contact us directly at [phone]. Thank you for allowing us to assist in the care of your patient. Therapist Signature: Winter Shaw \"BJ\" Chasidy Castro, DPT, Cert. MDT, Cert. DN, Cert. SMT, Dip. Osteopractic Date: 8/12/6484   Certification Period: 2/18/21-5/17/21     Reporting Period 3/18/21-4/16/21   Time: 11:47 AM   NOTE TO PHYSICIAN:  PLEASE COMPLETE THE ORDERS BELOW AND FAX TO   [facility]: [phone]. If you are unable to process this request in 24 hours please contact our office: [phone].    ___ I have read the above report and request that my patient continue as recommended.   ___ I have read the above report and request that my patient continue therapy with the following changes/special instructions:_________________________________________________________   ___ I have read the above report and request that my patient be discharged from therapy.      Physician Signature:        Date:       Time:                                        Kaur Mccann PA-C

## 2021-04-20 ENCOUNTER — HOSPITAL ENCOUNTER (OUTPATIENT)
Dept: PHYSICAL THERAPY | Age: 76
Discharge: HOME | End: 2021-04-20
Payer: MEDICARE

## 2021-04-20 PROCEDURE — 97110 THERAPEUTIC EXERCISES: CPT

## 2021-04-20 PROCEDURE — 97016 VASOPNEUMATIC DEVICE THERAPY: CPT

## 2021-04-20 PROCEDURE — 97530 THERAPEUTIC ACTIVITIES: CPT

## 2021-04-20 NOTE — PROGRESS NOTES
PHYSICAL THERAPY - DAILY TREATMENT NOTE    Patient Name: Mary Monge        Date: 2021  : 1945   YES Patient  Verified  Visit #:     Insurance: Payor: Ranell Laroche / Plan: [place] MEDICARE PART A & B / Product Type: Medicare /      In time: 8:40 Out time: 9:35   Total Treatment Time: 55     Medicare/BCBS Time Tracking (below)   Total Timed Codes (min):  55 1:1 Treatment Time:  45     TREATMENT AREA = Left knee pain [M25.562]  Status post left knee surgery [Z98.890]    SUBJECTIVE    Pain Level (on 0 to 10 scale):  0  / 10   Medication Changes/New allergies or changes in medical history, any new surgeries or procedures? NO    If yes, update Summary List   Subjective Functional Status/Changes:  []  No changes reported     \"I've been busier taking care of my wife. \"          OBJECTIVE     Therapeutic Procedures:  Min Procedure Specifics + Rationale   n/a [x]  Patient Education (performed throughout session) [x] Review HEP    [] Progressed/Changed HEP based on:   [] proper performance and advancement of Therex/TA   [] reduction in pain level    [] increased functional capacity       [] change in directional preference   25 [x] Therapeutic Exercise   [x]  See Flowsheet   Rationale: increase ROM and increase strength to improve the patients ability to participate in ADL's    15 [x] Therapeutic Activity   [x]  See Flowsheet  HK/Retro/Sidestepping on Grass  BOSU Step Ups  Mini Lunges    Rationale:  To improve safety, proprioception, coordination, and efficiency with tasks     Modality rationale: decrease inflammation, decrease pain, increase tissue extensibility and increase muscle contraction/control to improve the patients ability to perform ADL's with greater ease     Min Type Additional Details   15 [x]  Vasopneumatic Device  [] pre-KAILEY      [x] post-KAILEY  []  Ice massage Location:Left Knee    [] supine              [] prone  [] legs elevated    [] legs flat   [] sitting   [x] Skin assessment post-treatment:  [x]intact [x]redness- no adverse reaction       []redness  adverse reaction:     Other Objective/Functional Measures:    Increased reps/sets/resistance per flow sheet. Post Treatment Pain Level (on 0 to 10) scale:   0  / 10     ASSESSMENT    Assessment/Changes in Function:       Performed/participated in treatment well without complaints aside from muscular fatigue/deconditioning, indicating (+) response to current course of treatment to further improve functional status. Patient will continue to benefit from skilled PT services to modify and progress therapeutic interventions, address functional mobility deficits, address ROM deficits, address strength deficits, analyze and address soft tissue restrictions, analyze and cue movement patterns and instruct in home and community integration  to attain remaining goals   Progress toward goals / Updated goals:    Progressing in level of independence     PLAN    [x]  Upgrade activities as tolerated  [x]  Update interventions per flow sheet YES Continue plan of care   []  Discharge due to :    []  Other:      Therapist: Steve \"BJ\" Regine, MARY, Cert. MDT, Cert. DN, Cert. SMT, Dip.  Osteopractic    Date: 4/20/2021 Time: 8:53 AM     Future Appointments   Date Time Provider Jomar Henson   4/22/2021  9:30 AM [place], [place], [place] [address]   4/22/2021 11:45 AM Nico Cardenas PT [facility]   4/27/2021  8:45 AM Nico Cardenas PT [facility]   4/29/2021  8:45 AM Nico Cardenas PT [facility]

## 2021-04-22 ENCOUNTER — HOSPITAL ENCOUNTER (OUTPATIENT)
Dept: PHYSICAL THERAPY | Age: 76
Discharge: HOME | End: 2021-04-22
Payer: MEDICARE

## 2021-04-22 ENCOUNTER — APPOINTMENT (OUTPATIENT)
Dept: PHYSICAL THERAPY | Age: 76
End: 2021-04-22
Payer: MEDICARE

## 2021-04-22 PROCEDURE — 97016 VASOPNEUMATIC DEVICE THERAPY: CPT

## 2021-04-22 PROCEDURE — 97110 THERAPEUTIC EXERCISES: CPT

## 2021-04-22 PROCEDURE — 97530 THERAPEUTIC ACTIVITIES: CPT

## 2021-04-22 NOTE — PROGRESS NOTES
PHYSICAL THERAPY - DAILY TREATMENT NOTE    Patient Name: Uday Singleton        Date: 2021  : 1945   YES Patient  Verified  Visit #:     Insurance: Payor: Vivian Smyth / Plan: Loren Josue / Product Type: Medicare /      In time: 11:27 Out time: 12:25   Total Treatment Time: 58     Medicare/BCBS Time Tracking (below)   Total Timed Codes (min):  58 1:1 Treatment Time:  43     TREATMENT AREA = Left knee pain [M25.562]  Status post left knee surgery [Z98.890]    SUBJECTIVE    Pain Level (on 0 to 10 scale):  0  / 10   Medication Changes/New allergies or changes in medical history, any new surgeries or procedures? NO    If yes, update Summary List   Subjective Functional Status/Changes:  []  No changes reported     \"My wife is slowly getting better as I'm doing more around the house. \"          OBJECTIVE     Therapeutic Procedures:  Min Procedure Specifics + Rationale   n/a [x]  Patient Education (performed throughout session) [x] Review HEP    [] Progressed/Changed HEP based on:   [] proper performance and advancement of Therex/TA   [] reduction in pain level    [] increased functional capacity       [] change in directional preference   32 [x] Therapeutic Exercise   [x]  See Flowsheet   Rationale: increase ROM and increase strength to improve the patients ability to participate in ADL's    16 [x] Therapeutic Activity   [x]  See Flowsheet  Rationale:  To improve safety, proprioception, coordination, and efficiency with tasks     Modality rationale: decrease inflammation, decrease pain, increase tissue extensibility and increase muscle contraction/control to improve the patients ability to perform ADL's with greater ease     Min Type Additional Details   15 [x]  Vasopneumatic Device  [] pre-KAILEY      [x] post-KAILEY  []  Ice massage Location:Knee    [] supine              [] prone  [] legs elevated    [] legs flat   [] sitting   [x] Skin assessment post-treatment:  [x]intact [x]redness- no adverse reaction       []redness  adverse reaction:     Other Objective/Functional Measures:    Increased reps/sets/resistance per flow sheet. Post Treatment Pain Level (on 0 to 10) scale:   0  / 10     ASSESSMENT    Assessment/Changes in Function:       Improved squat depth and lunge depth         Patient will continue to benefit from skilled PT services to modify and progress therapeutic interventions, address functional mobility deficits, address ROM deficits, address strength deficits, analyze and address soft tissue restrictions, analyze and cue movement patterns, analyze and modify body mechanics/ergonomics and instruct in home and community integration  to attain remaining goals   Progress toward goals / Updated goals:    No limitations in balance on hard floor     PLAN    [x]  Upgrade activities as tolerated  [x]  Update interventions per flow sheet YES Continue plan of care   []  Discharge due to :    []  Other:      Therapist: Gavin Sweet \"GERARDO\" Rajendra Holder, DPT, Cert. MDT, Cert. DN, Cert. SMT, Dip.  Osteopractic    Date: 4/22/2021 Time: 11:44 AM     Future Appointments   Date Time Provider Jomar Henson   4/22/2021 11:45 AM Angela Nair, PT [facility]   4/27/2021  8:45 AM Angela Nair, PT [facility]   4/29/2021  8:45 AM Angela Nair, PT [facility]   5/4/2021  8:45 AM Angela Nair, PT [facility]   5/6/2021  8:45 AM Angela Korey, PT [facility]   5/11/2021  8:45 AM Angelaharleen Nair, PT [facility]   5/13/2021  8:45 AM Angelaharleen Herreraan, PT [facility]   5/18/2021  8:45 AM Angela Nair, PT [facility]   5/20/2021  8:45 AM Angela Korey, PT [facility]   5/25/2021  8:45 AM Angela Nair, PT [facility]   5/27/2021  8:45 AM [facility]

## 2021-04-27 ENCOUNTER — APPOINTMENT (OUTPATIENT)
Dept: PHYSICAL THERAPY | Age: 76
End: 2021-04-27
Payer: MEDICARE

## 2021-04-29 ENCOUNTER — APPOINTMENT (OUTPATIENT)
Dept: PHYSICAL THERAPY | Age: 76
End: 2021-04-29
Payer: MEDICARE

## 2021-05-04 ENCOUNTER — HOSPITAL ENCOUNTER (OUTPATIENT)
Dept: PHYSICAL THERAPY | Age: 76
Discharge: HOME | End: 2021-05-04
Payer: MEDICARE

## 2021-05-04 PROCEDURE — 97140 MANUAL THERAPY 1/> REGIONS: CPT

## 2021-05-04 PROCEDURE — 97110 THERAPEUTIC EXERCISES: CPT

## 2021-05-04 PROCEDURE — 97530 THERAPEUTIC ACTIVITIES: CPT

## 2021-05-04 NOTE — PROGRESS NOTES
PHYSICAL THERAPY - DAILY TREATMENT NOTE    Patient Name: Ramon Bowen        Date: 2021  : 1945   YES Patient  Verified  Visit #:   +  Insurance: Payor: Lauri Hernandez / Plan: [place] MEDICARE PART A & B / Product Type: Medicare /      In time: 8:35 Out time: 9:40   Total Treatment Time: 65     Medicare/BCBS Time Tracking (below)   Total Timed Codes (min):  65 1:1 Treatment Time:  55     TREATMENT AREA = Left knee pain [M25.562]  Status post left knee surgery [Z98.890]    SUBJECTIVE    Pain Level (on 0 to 10 scale): \"Stiff\"  / 10   Medication Changes/New allergies or changes in medical history, any new surgeries or procedures? NO    If yes, update Summary List   Subjective Functional Status/Changes:  []  No changes reported     \"I missed last week because I had a planned prostate procedure. Ended up staying at the hospital overnight one night for monitoring, didn't get any sleep. I wasn't really able to do my exercises all week because of it.  \"          OBJECTIVE     Therapeutic Procedures:  Min Procedure Specifics + Rationale   n/a [x]  Patient Education (performed throughout session) [x] Review HEP    [] Progressed/Changed HEP based on:   [] proper performance and advancement of Therex/TA   [] reduction in pain level    [] increased functional capacity       [] change in directional preference   30 [x] Therapeutic Exercise   [x]  See Flowsheet   Rationale: increase ROM and increase strength to improve the patients ability to participate in ADL's    10 [x]  Manual Therapy       [] IASTM -   [x] Involved Knee Patellar G2-G4 mobs inferior/medial, STM to quads/HS/ITB interface, Ant/Post G3-G4 Tibiofemoral glides, PROM flexion/Ext contract/relax  Rationale: decrease pain, increase ROM, increase tissue extensibility, decrease trigger points and increase postural awareness to attain functional use and participation with ADL's  [x] Skin assessment post-treatment:  [x]intact [x]redness- no adverse reaction   []redness  adverse  The manual therapy interventions were performed at a separate and distinct time from the therapeutic activities interventions. 15 [x] Therapeutic Activity   [x]  See Flowsheet  Agility Ladder: Zig Zag, mini-zigzag, in/out sidestep, in/out forward, sidestepping, HK,  Rationale: To improve safety, proprioception, coordination, and efficiency with tasks     Modality rationale: decrease inflammation, decrease pain, increase tissue extensibility and increase muscle contraction/control to improve the patients ability to perform ADL's with greater ease     Min Type Additional Details   10 [x]  Cold Pack   [] pre-KAILEY      [x] post-KAILEY  []  Ice massage Location:left knee    [] supine              [] prone  [] legs elevated    [] legs flat   [] sitting   [x] Skin assessment post-treatment:  [x]intact [x]redness- no adverse reaction       []redness  adverse reaction:     Other Objective/Functional Measures: Added DF on decline board. AROM 3-106 degrees     Post Treatment Pain Level (on 0 to 10) scale:   0  / 10     ASSESSMENT    Assessment/Changes in Function:     Mild regression in ROM due to lack of activity in preparation for procedure last week            Patient will continue to benefit from skilled PT services to modify and progress therapeutic interventions, address functional mobility deficits, address ROM deficits, address strength deficits, analyze and address soft tissue restrictions, analyze and cue movement patterns, analyze and modify body mechanics/ergonomics and instruct in home and community integration  to attain remaining goals   Progress toward goals / Updated goals:    Steady improvement in balance and coordination. PLAN    [x]  Upgrade activities as tolerated  [x]  Update interventions per flow sheet YES Continue plan of care   []  Discharge due to :    []  Other:      Therapist: Vee Siddiqi \"BJ\" MARY Weiner, Cert. MDT, Cert. DN, Cert. SMT, Dip.  Osteopractic Date: 5/4/2021 Time: 8:44 AM     Future Appointments   Date Time Provider Jomar Henson   5/4/2021  8:45 AM [place] Bold, PT [facility]   5/6/2021  8:00 AM [place] Bold, PT [facility]   5/6/2021  9:15 AM Jennifer Rocha, [address]   5/11/2021  8:45 AM Christopher Bold, PT [facility]   5/13/2021  8:45 AM Christopher Bold, PT [facility]   5/18/2021  8:45 AM Christopher Bold, PT [facility]   5/20/2021  8:45 AM Christopher Bold, PT [facility]   5/25/2021  8:45 AM [place] Bold, PT [facility]   5/27/2021  8:45 AM Christopher Bold, PT [facility]

## 2021-05-06 ENCOUNTER — HOSPITAL ENCOUNTER (OUTPATIENT)
Dept: PHYSICAL THERAPY | Age: 76
Discharge: HOME | End: 2021-05-06
Payer: MEDICARE

## 2021-05-06 PROCEDURE — 97530 THERAPEUTIC ACTIVITIES: CPT

## 2021-05-06 PROCEDURE — 97110 THERAPEUTIC EXERCISES: CPT

## 2021-05-06 NOTE — PROGRESS NOTES
PHYSICAL THERAPY - DAILY TREATMENT NOTE    Patient Name: Aurora Robles        Date: 2021  : 1945   YES Patient  Verified  Visit #:     Insurance: Payor: Satinder Marie / Plan: [place] MEDICARE PART A & B / Product Type: Medicare /      In time: 7:50 Out time: 8:30   Total Treatment Time: 40     Medicare/BCBS Time Tracking (below)   Total Timed Codes (min):  40 1:1 Treatment Time:  40     TREATMENT AREA = Left knee pain [M25.562]  Status post left knee surgery [Z98.890]    SUBJECTIVE    Pain Level (on 0 to 10 scale):  0  / 10   Medication Changes/New allergies or changes in medical history, any new surgeries or procedures? NO    If yes, update Summary List   Subjective Functional Status/Changes:  []  No changes reported     \"Stiff. But ok. I've got a follow up for my prostate later today. \"          OBJECTIVE     Therapeutic Procedures:  Min Procedure Specifics + Rationale   n/a [x]  Patient Education (performed throughout session) [x] Review HEP    [] Progressed/Changed HEP based on:   [] proper performance and advancement of Therex/TA   [] reduction in pain level    [] increased functional capacity       [] change in directional preference   25 [x] Therapeutic Exercise   [x]  See Flowsheet   Rationale: increase ROM and increase strength to improve the patients ability to participate in ADL's    15 [x] Therapeutic Activity   [x]  See Flowsheet  Agility Ladder: Zig Zag, mini-zigzag, in/out sidestep, in/out forward, sidestepping, HK, lateral jb jb. Rationale:  To improve safety, proprioception, coordination, and efficiency with tasks       Other Objective/Functional Measures:    See PN     Post Treatment Pain Level (on 0 to 10) scale:   0-1  / 10     ASSESSMENT    Assessment/Changes in Function:       See PN         Patient will continue to benefit from skilled PT services to modify and progress therapeutic interventions, address functional mobility deficits, address ROM deficits, address strength deficits, analyze and address soft tissue restrictions, analyze and cue movement patterns, analyze and modify body mechanics/ergonomics and instruct in home and community integration  to attain remaining goals   Progress toward goals / Updated goals:    See PN     PLAN    [x]  Upgrade activities as tolerated  [x]  Update interventions per flow sheet YES Continue plan of care   []  Discharge due to :    []  Other:      Therapist: Jose F Rae \"BJ\" Regine, MARY, Cert. MDT, Cert. DN, Cert. SMT, Dip.  Osteopractic    Date: 5/6/2021 Time: 8:01 AM     Future Appointments   Date Time Provider Jomar Henson   5/6/2021  9:15 AM [place] [place], [place] [address]   5/11/2021  8:45 AM Yessy Posey PT [facility]   5/13/2021  8:45 AM Yessy Posey PT [facility]   5/18/2021  8:45 AM Yessy Posey PT [facility]   5/20/2021  8:45 AM Yessy Posey PT [facility]   5/25/2021  8:45 AM Yessy Posey PT [facility]   5/27/2021  8:45 AM [facility]

## 2021-05-06 NOTE — PROGRESS NOTES
[address]  [facility] PHYSICAL THERAPY  [address], [address] - Phone: [phone]  Fax: [phone]  PROGRESS NOTE  Patient Name: Lakesha Oakes :    Treatment/Medical Diagnosis: Left knee pain [M25.562]  Status post left knee surgery [Z98.890]   Referral Source: Tolu Hinojosa     Date of Initial Visit: 21 Attended Visits: 21 Missed Visits: 1 week     SUMMARY OF TREATMENT  Patient missed over a week of treatment due to undergoing a procedure for his prostate, and had been advised by his MD to hold therapy until he recovered. He has since returned, and had a slight regression in ROM due to inactivity from being bedridden. AROM currently 3-106 degrees; PROM = 1-110 degrees, previously AROM 2-112 degrees and PROM 0-114 degrees. RECOMMENDATIONS  Resume PT x2-3 more weeks to allow for recovery of ROM and promote independence in HEP before  [facility] insurance certification authorization expires. If you have any questions/comments please contact us directly at [phone]. Thank you for allowing us to assist in the care of your patient. Therapist Signature: Ross Head \"BJ\" Birgit Marie DPT, Cert. MDT, Cert. DN, Cert. SMT, Dip. Osteopractic Date: 9/3/4664   Certification Period: 21-21     Reporting Period 21-21   Time: 10:44 AM   NOTE TO PHYSICIAN:  PLEASE COMPLETE THE ORDERS BELOW AND FAX TO   [facility] [place] [facility]: [phone]. If you are unable to process this request in 24 hours please contact our office: [phone].    ___ I have read the above report and request that my patient continue as recommended.   ___ I have read the above report and request that my patient continue therapy with the following changes/special instructions:_________________________________________________________   ___ I have read the above report and request that my patient be discharged from therapy.      Physician Signature: Date:       Time:                                        Aundria Stain, PAMARIBELL

## 2021-05-11 ENCOUNTER — HOSPITAL ENCOUNTER (OUTPATIENT)
Dept: PHYSICAL THERAPY | Age: 76
Discharge: HOME | End: 2021-05-11
Payer: MEDICARE

## 2021-05-11 PROCEDURE — 97530 THERAPEUTIC ACTIVITIES: CPT

## 2021-05-11 PROCEDURE — 97110 THERAPEUTIC EXERCISES: CPT

## 2021-05-11 NOTE — PROGRESS NOTES
PHYSICAL THERAPY - DAILY TREATMENT NOTE    Patient Name: Randy Francis        Date: 2021  : 1945   YES Patient  Verified  Visit #:     Insurance: Payor: Manjinder Armenta / Plan: [place] MEDICARE PART A & B / Product Type: Medicare /      In time: 8:41 Out time: 9:31   Total Treatment Time: 50     Medicare/BCBS Time Tracking (below)   Total Timed Codes (min):  50 1:1 Treatment Time:  40     TREATMENT AREA = Left knee pain [M25.562]  Status post left knee surgery [Z98.890]    SUBJECTIVE    Pain Level (on 0 to 10 scale):  0  / 10   Medication Changes/New allergies or changes in medical history, any new surgeries or procedures? NO    If yes, update Summary List   Subjective Functional Status/Changes:  []  No changes reported     \"Been busy helping out with the wife. \"          OBJECTIVE     Therapeutic Procedures:  Min Procedure Specifics + Rationale   n/a [x]  Patient Education (performed throughout session) [x] Review HEP    [] Progressed/Changed HEP based on:   [] proper performance and advancement of Therex/TA   [] reduction in pain level    [] increased functional capacity       [] change in directional preference   25 [x] Therapeutic Exercise   [x]  See Flowsheet   Rationale: increase ROM and increase strength to improve the patients ability to participate in ADL's    15 [x] Therapeutic Activity   [x]  See Flowsheet  Agility Ladder: Zig Zag, mini-zigzag, in/out sidestep, in/out forward, sidestepping, HK, lateral jb jb.   TG  Step ups  Rationale: To improve safety, proprioception, coordination, and efficiency with tasks         Other Objective/Functional Measures:    Increased reps/sets/resistance per flow sheet. Post Treatment Pain Level (on 0 to 10) scale:   1  / 10     ASSESSMENT    Assessment/Changes in Function:     Performed new therex well without complaints.           Patient will continue to benefit from skilled PT services to modify and progress therapeutic interventions, address functional mobility deficits, address ROM deficits, address strength deficits, analyze and address soft tissue restrictions, analyze and cue movement patterns, analyze and modify body mechanics/ergonomics and instruct in home and community integration  to attain remaining goals   Progress toward goals / Updated goals:    1st session since progress note. No significant progress observed       PLAN    [x]  Upgrade activities as tolerated  [x]  Update interventions per flow sheet YES Continue plan of care   []  Discharge due to :    []  Other:      Therapist: Antonieta Cortes \"BJ\" Regine, MARY, Cert. MDT, Cert. DN, Cert. SMT, Dip.  Osteopractic    Date: 5/11/2021 Time: 9:11 AM     Future Appointments   Date Time Provider Jomar Henson   5/13/2021  8:45 AM Seymour Ordoñez, PT [facility]   5/18/2021  8:45 AM Seymour Ordoñez PT [facility]   5/20/2021  8:45 AM Seymour Ordoñez PT [facility]   5/25/2021  8:45 AM Seymour Ordoñez PT [facility]   5/27/2021  8:45 AM Seymour Ordoñez, PT [facility]   8/12/2021  9:45 AM Tiarra Rocha, DO [address]

## 2021-05-13 ENCOUNTER — HOSPITAL ENCOUNTER (OUTPATIENT)
Dept: PHYSICAL THERAPY | Age: 76
Discharge: HOME | End: 2021-05-13
Payer: MEDICARE

## 2021-05-13 PROCEDURE — 97112 NEUROMUSCULAR REEDUCATION: CPT

## 2021-05-13 PROCEDURE — 97530 THERAPEUTIC ACTIVITIES: CPT

## 2021-05-13 PROCEDURE — 97110 THERAPEUTIC EXERCISES: CPT

## 2021-05-13 NOTE — PROGRESS NOTES
PHYSICAL THERAPY - DAILY TREATMENT NOTE    Patient Name: Williams Guadalupe        Date: 2021  : 1945   YES Patient  Verified  Visit #:     Insurance: Payor: Lukas Hind / Plan: [place] MEDICARE PART A & B / Product Type: Medicare /      In time: 8:38 Out time: 9:26   Total Treatment Time: 48     Medicare/BCBS Time Tracking (below)   Total Timed Codes (min):  48 1:1 Treatment Time:  38     TREATMENT AREA = Left knee pain [M25.562]  Status post left knee surgery [Z98.890]    SUBJECTIVE    Pain Level (on 0 to 10 scale):  0  / 10   Medication Changes/New allergies or changes in medical history, any new surgeries or procedures? NO    If yes, update Summary List   Subjective Functional Status/Changes:  []  No changes reported     \"My wife is moving better at home so I don't have to do as much as before for her since her surgery. \"          OBJECTIVE     Therapeutic Procedures:  Min Procedure Specifics + Rationale   n/a [x]  Patient Education (performed throughout session) [x] Review HEP    [] Progressed/Changed HEP based on:   [] proper performance and advancement of Therex/TA   [] reduction in pain level    [] increased functional capacity       [] change in directional preference   15 [x] Therapeutic Exercise   [x]  See Flowsheet   Rationale: increase ROM and increase strength to improve the patients ability to participate in ADL's    15 [x] Therapeutic Activity   [x]  See Flowsheet  Rationale:  To improve safety, proprioception, coordination, and efficiency with tasks   8 [x] Neuromuscular Re-ed   [x]  See Flowsheet  BOSU therex per flow sheet  Rationale: increase ROM, increase strength, improve coordination, improve balance and increase proprioception  to improve the patients ability to safely participate in ADL's     Modality rationale: decrease inflammation, decrease pain, increase tissue extensibility and increase muscle contraction/control to improve the patients ability to perform ADL's with greater ease     Min Type Additional Details   10 [x]  Cold Pack   [] pre-KAILEY      [x] post-KAILEY  []  Ice massage Location:left knee    [x] supine              [] prone  [x] legs elevated    [] legs flat   [] sitting   [x] Skin assessment post-treatment:  [x]intact [x]redness- no adverse reaction       []redness  adverse reaction:     Other Objective/Functional Measures:    Modified therex per flow sheet to advance with BOSU     Post Treatment Pain Level (on 0 to 10) scale:   0  / 10     ASSESSMENT    Assessment/Changes in Function:       Good balance noted on BOSU         Patient will continue to benefit from skilled PT services to modify and progress therapeutic interventions, address functional mobility deficits, address ROM deficits, address strength deficits, analyze and address soft tissue restrictions, analyze and cue movement patterns, analyze and modify body mechanics/ergonomics and instruct in home and community integration  to attain remaining goals   Progress toward goals / Updated goals:    Progressing to DC by end of month     PLAN    [x]  Upgrade activities as tolerated  [x]  Update interventions per flow sheet YES Continue plan of care   []  Discharge due to :    []  Other:      Therapist: Michel Huitron \"BJ\" Nica Lindsay, DPT, Cert. MDT, Cert. DN, Cert. SMT, Dip.  Osteopractic    Date: 5/13/2021 Time: 9:02 AM     Future Appointments   Date Time Provider Jomar Henson   5/18/2021  8:45 AM Lakshmi Mchugh PT [facility]   5/20/2021  8:45 AM Lakshmi Mchugh PT [facility]   5/25/2021  8:45 AM Lakshmi Mchugh PT [facility]   5/27/2021  8:45 AM Lakshmi Mchugh PT [facility]   8/12/2021  9:45 AM Ellie Rocha DO [place]

## 2021-05-18 ENCOUNTER — APPOINTMENT (OUTPATIENT)
Dept: PHYSICAL THERAPY | Age: 76
End: 2021-05-18
Payer: MEDICARE

## 2021-05-19 ENCOUNTER — HOSPITAL ENCOUNTER (OUTPATIENT)
Dept: PHYSICAL THERAPY | Age: 76
Discharge: HOME | End: 2021-05-19
Payer: MEDICARE

## 2021-05-19 PROCEDURE — 97110 THERAPEUTIC EXERCISES: CPT

## 2021-05-19 PROCEDURE — 97530 THERAPEUTIC ACTIVITIES: CPT

## 2021-05-19 NOTE — PROGRESS NOTES
PHYSICAL THERAPY - DAILY TREATMENT NOTE    Patient Name: Williams Guadalupe        Date: 2021  : 1945   YES Patient  Verified  Visit #:     Insurance: Payor: Lukas Hind / Plan: [place] & B / Product Type: Medicare /      In time: 1:50 Out time: 2:40   Total Treatment Time: 50     Medicare/BCBS Time Tracking (below)   Total Timed Codes (min):  50 1:1 Treatment Time:  40     TREATMENT AREA = Left knee pain [M25.562]  Status post left knee surgery [Z98.890]    SUBJECTIVE    Pain Level (on 0 to 10 scale):  \"sore\"  / 10   Medication Changes/New allergies or changes in medical history, any new surgeries or procedures? NO    If yes, update Summary List   Subjective Functional Status/Changes:  []  No changes reported     \"It's been on and off sore. I've been cycling 5 miles on my bike again. \"          OBJECTIVE     Therapeutic Procedures:  Min Procedure Specifics + Rationale   n/a [x]  Patient Education (performed throughout session) [x] Review HEP    [] Progressed/Changed HEP based on:   [] proper performance and advancement of Therex/TA   [] reduction in pain level    [] increased functional capacity       [] change in directional preference   25 [x] Therapeutic Exercise   [x]  See Flowsheet   Rationale: increase ROM and increase strength to improve the patients ability to participate in ADL's    15 [x] Therapeutic Activity   [x]  See Flowsheet  Agility Ladder: Zig Zag, mini-zigzag, in/out sidestep, in/out forward, sidestepping, HK, lateral jb jb. Step Ups/Side Step ups  BOSU  Rationale:  To improve safety, proprioception, coordination, and efficiency with tasks     Modality rationale: decrease inflammation, decrease pain, increase tissue extensibility and increase muscle contraction/control to improve the patients ability to perform ADL's with greater ease     Min Type Additional Details   10 [x]  Cold Pack   [] pre-KAILEY      [x] post-KAILEY  []  Ice massage Location:shoulder [] supine              [] prone  [] legs elevated    [] legs flat   [] sitting   [x] Skin assessment post-treatment:  [x]intact [x]redness- no adverse reaction       []redness  adverse reaction:     Other Objective/Functional Measures:    Increased reps/sets/resistance per flow sheet. Post Treatment Pain Level (on 0 to 10) scale:   0  / 10     ASSESSMENT    Assessment/Changes in Function:       Significant improvement in SLS         Patient will continue to benefit from skilled PT services to modify and progress therapeutic interventions, address functional mobility deficits, address ROM deficits, address strength deficits, analyze and address soft tissue restrictions, analyze and cue movement patterns, analyze and modify body mechanics/ergonomics and instruct in home and community integration  to attain remaining goals   Progress toward goals / Updated goals:    Progressing to DC at end of the [facility]     PLAN    [x]  Upgrade activities as tolerated  [x]  Update interventions per flow sheet YES Continue plan of care   []  Discharge due to :    []  Other:      Therapist: Jose F Rae \"BJ\" MARY Weiner, Cert. MDT, Cert. DN, Cert. SMT, Dip.  Osteopractic    Date: 5/19/2021 Time: 2:23 PM     Future Appointments   Date Time Provider Jomar Henson   5/20/2021  8:45 AM Yessy Posey PT [facility]   5/25/2021  8:45 AM Yessy Posey PT [facility]   5/27/2021  8:45 AM Yessy Posey PT [facility]   8/12/2021  9:45 AM Darlin Rocha, DO [address]

## 2021-05-20 ENCOUNTER — HOSPITAL ENCOUNTER (OUTPATIENT)
Dept: PHYSICAL THERAPY | Age: 76
Discharge: HOME | End: 2021-05-20
Payer: MEDICARE

## 2021-05-20 PROCEDURE — 97110 THERAPEUTIC EXERCISES: CPT

## 2021-05-20 PROCEDURE — 97530 THERAPEUTIC ACTIVITIES: CPT

## 2021-05-20 NOTE — PROGRESS NOTES
PHYSICAL THERAPY - DAILY TREATMENT NOTE    Patient Name: Chan Soto        Date: 2021  : 1945   YES Patient  Verified  Visit #:     Insurance: Payor: Augustus Solum / Plan: [place] MEDICARE PART A & B / Product Type: Medicare /      In time: 8:40 Out time: 9:35   Total Treatment Time: 55     Medicare/BCBS Time Tracking (below)   Total Timed Codes (min):  55 1:1 Treatment Time:  55     TREATMENT AREA = Left knee pain [M25.562]  Status post left knee surgery [Z98.890]    SUBJECTIVE    Pain Level (on 0 to 10 scale):  0  / 10   Medication Changes/New allergies or changes in medical history, any new surgeries or procedures? NO    If yes, update Summary List   Subjective Functional Status/Changes:  []  No changes reported     \"I'm a lot better than yesterday\"          OBJECTIVE     Therapeutic Procedures:  Min Procedure Specifics + Rationale   n/a [x]  Patient Education (performed throughout session) [x] Review HEP    [] Progressed/Changed HEP based on:   [] proper performance and advancement of Therex/TA   [] reduction in pain level    [] increased functional capacity       [] change in directional preference   25 [x] Therapeutic Exercise   [x]  See Flowsheet   Rationale: increase ROM and increase strength to improve the patients ability to participate in ADL's    30 [x] Therapeutic Activity   [x]  See Flowsheet  Loaded Carries with 10lb and 15lb KB  Walking/HK/Step Ups/Sit <-->stand  SLS on AE  Hip 3 way on AE  Rationale: To improve safety, proprioception, coordination, and efficiency with tasks          Other Objective/Functional Measures:    Performed SLS with EC on AE to further challenge balance. Post Treatment Pain Level (on 0 to 10) scale:   0  / 10     ASSESSMENT    Assessment/Changes in Function:       Continued improvement in balance and ability to carry load.          Patient will continue to benefit from skilled PT services to modify and progress therapeutic interventions, address functional mobility deficits, address ROM deficits, address strength deficits, analyze and address soft tissue restrictions, analyze and cue movement patterns, analyze and modify body mechanics/ergonomics and instruct in home and community integration  to attain remaining goals   Progress toward goals / Updated goals:    Progressing to DC in 1-2 sessions     PLAN    [x]  Upgrade activities as tolerated  [x]  Update interventions per flow sheet YES Continue plan of care   []  Discharge due to :    []  Other:      Therapist: Dustin Rajan \"BJ\" MARY Weiner, Cert. MDT, Cert. DN, Cert. SMT, Dip.  Osteopractic    Date: 5/20/2021 Time: 8:48 AM     Future Appointments   Date Time Provider Jomar Henson   5/25/2021  8:45 AM Laura Oviedo, PT [facility]   5/27/2021  8:45 AM Laura Oviedo PT [facility]   8/12/2021  9:45 AM Shayla Rocha DO [place]

## 2021-05-25 ENCOUNTER — HOSPITAL ENCOUNTER (OUTPATIENT)
Dept: PHYSICAL THERAPY | Age: 76
Discharge: HOME | End: 2021-05-25
Payer: MEDICARE

## 2021-05-25 PROCEDURE — 97530 THERAPEUTIC ACTIVITIES: CPT

## 2021-05-25 PROCEDURE — 97110 THERAPEUTIC EXERCISES: CPT

## 2021-05-25 NOTE — PROGRESS NOTES
PHYSICAL THERAPY - DAILY TREATMENT NOTE    Patient Name: Rosaura Oakes        Date: 2021  : 1945   YES Patient  Verified  Visit #:   32   of     Insurance: Payor: Julian Olivera / Plan: [place] MEDICARE PART A & B / Product Type: Medicare /      In time: 8:44 Out time: 9:34   Total Treatment Time: 50     Medicare/BCBS Time Tracking (below)   Total Timed Codes (min):  50 1:1 Treatment Time:  50     TREATMENT AREA = Left knee pain [M25.562]  Status post left knee surgery [Z98.890]    SUBJECTIVE    Pain Level (on 0 to 10 scale):  0  / 10   Medication Changes/New allergies or changes in medical history, any new surgeries or procedures? NO    If yes, update Summary List   Subjective Functional Status/Changes:  []  No changes reported     \"Had a pretty good weekend. Worked in the garden and yard. But yesterday I was in the garden while my company was on the back deck and I tripped in my garden and fell in front of everyone. I was able to get up on my own though. No pain, just a little stiff. \"          OBJECTIVE     Therapeutic Procedures:  Min Procedure Specifics + Rationale   n/a [x]  Patient Education (performed throughout session) [x] Review HEP    [] Progressed/Changed HEP based on:   [] proper performance and advancement of Therex/TA   [] reduction in pain level    [] increased functional capacity       [] change in directional preference   15 [x] Therapeutic Exercise   [x]  See [address] board TR  Rationale: increase ROM and increase strength to improve the patients ability to participate in ADL's    25 [x] Therapeutic Activity   [x]  See Flowsheet  Agility ladder: (HK, lateral side, retro gait, in/out forward)  SLS Contralateral reach (Steps)   Rationale:  To improve safety, proprioception, coordination, and efficiency with tasks     Modality rationale: decrease inflammation, decrease pain, increase tissue extensibility and increase muscle contraction/control to improve the patients ability to perform ADL's with greater ease     Min Type Additional Details   10 [x]  Cold Pack   [] pre-KAILEY      [x] post-KAILEY  []  Ice massage Location:left knee    [x] supine              [] prone  [x] legs elevated    [] legs flat   [] sitting   [x] Skin assessment post-treatment:  [x]intact [x]redness- no adverse reaction       []redness  adverse reaction:     Other Objective/Functional Measures:    Pt progressed Therex:   Incline board TR to promote dorsiflexion to clear toe when negotiating obstacles  Bosu march/ SLS reach to promote balance and proprioception     Post Treatment Pain Level (on 0 to 10) scale:   0  / 10     ASSESSMENT    Assessment/Changes in Function:     Pt displays increased confidence during balance/proprioceptive therapeutic activity based on decreased use of visual input (looking forward). Pt reports slight tightness in distal HS post treatment with no pain. Patient will continue to benefit from skilled PT services to modify and progress therapeutic interventions, address functional mobility deficits, address ROM deficits, address strength deficits, analyze and address soft tissue restrictions, analyze and cue movement patterns, analyze and modify body mechanics/ergonomics, assess and modify postural abnormalities, address imbalance/dizziness and instruct in home and community integration  to attain remaining goals   Progress toward goals / Updated goals:    Level of independence indicates readiness to DC [place]     PLAN    [x]  Upgrade activities as tolerated  [x]  Update interventions per flow sheet YES Continue plan of care   []  Discharge due to :    []  Other:      Therapist: Darcy Trammell \"BJ\" [address], DPT, Cert. MDT, Cert. DN, Cert. SMT, Dip.  Osteopractic    Date: 5/25/2021 Time: 9:40 AM     Future Appointments   Date Time Provider Jomar Henson   5/27/2021  8:45 AM Sienna Rodas, PT [facility]   8/12/2021  9:45 AM Peter Rocha DO [place]

## 2021-05-27 ENCOUNTER — HOSPITAL ENCOUNTER (OUTPATIENT)
Dept: PHYSICAL THERAPY | Age: 76
Discharge: HOME | End: 2021-05-27
Payer: MEDICARE

## 2021-05-27 PROCEDURE — 97530 THERAPEUTIC ACTIVITIES: CPT

## 2021-05-27 PROCEDURE — 97110 THERAPEUTIC EXERCISES: CPT

## 2021-05-27 NOTE — PROGRESS NOTES
[address] [facility] PHYSICAL THERAPY  [address] [address], [address] - Phone: [phone]  Fax: [phone] SUMMARY  Patient Name: Thien Trotter :    Treatment/Medical Diagnosis: Left knee pain [M25.562]  Status post left knee surgery [Z98.890]   Referral Source: Peterson Shrestha     Date of Initial Visit: 2/15/21 Attended Visits: 27 Missed Visits: 1     SUMMARY OF TREATMENT  Patient's POC has consisted of therex, therapeutic activities, manual therapy prn, modalities prn, pt. education, and a comprehensive HEP. Treatment strategies used to address functional mobility deficits, ROM deficits, strength deficits, analyze and address soft tissue restrictions, analyze and cue movement patterns, analyze and modify body mechanics/ergonomics, assess and modify postural abnormalities and instruct in home and community integration. CURRENT STATUS  Patient made excellent progress and was able to restore/improve knee ROM further as follows:  Left knee AROM 2-112 degrees, PROM 0-120 degrees  He has resumed cycling without difficulty, and is walking over 1 mile/day. He is able to work in his garden and perform floor to stand transfers independently. GOALS/MEASURE OF PROGRESS Goal Met? 1. Patient to be Safe and Independent with HEP to self-manage/prevent symptoms after DC. 2. Patient to increase FS FOTO score to > 60 to indicate increased functional independence. MET      MET (80)     RECOMMENDATIONS  Discontinue therapy. Progressing towards or have reached established goals. If you have any questions/comments please contact us directly at [phone]. Thank you for allowing us to assist in the care of your patient. Therapist Signature: Vee Siddiqi \"GERARDO\" JEFF GutiérrezT, Cert. MDT, Cert. DN, Cert. SMT, Dip.  Osteopractic Date: 21   Reporting Period: 51-68     Certification Period 21-21 Time: 9:36 AM     NOTE TO PHYSICIAN: PLEASE COMPLETE THE ORDERS BELOW AND FAX TO   [facility]: [phone]  If you are unable to process this request in 24 hours please contact our office: [phone]    ___ I have read the above report and request that my patient continue as recommended.   ___ I have read the above report and request that my patient continue therapy with the following changes/special instructions:_________________________________________________________   ___ I have read the above report and request that my patient be discharged from therapy.      Physician Signature:        Date:     Time:

## 2021-05-27 NOTE — PROGRESS NOTES
PHYSICAL THERAPY - DAILY TREATMENT NOTE    Patient Name: Kofi Chowdhury        Date: 2021  : 1945   YES Patient  Verified  Visit #:      of     Insurance: Payor: Surinder Galdamez / Plan: [place] MEDICARE PART A & B / Product Type: Medicare /      In time: 8:45 am Out time: 9:30 am   Total Treatment Time: 35 min     Medicare/BCBS Time Tracking (below)   Total Timed Codes (min):  35 1:1 Treatment Time:  25     TREATMENT AREA = Left knee pain [M25.562]  Status post left knee surgery [Z98.890]    SUBJECTIVE    Pain Level (on 0 to 10 scale):  0  / 10   Medication Changes/New allergies or changes in medical history, any new surgeries or procedures? NO    If yes, update Summary List   Subjective Functional Status/Changes:  []  No changes reported     \"I feel good this morning. No pain just left knee stiffness. \"         OBJECTIVE     Therapeutic Procedures:  Min Procedure Specifics + Rationale   n/a [x]  Patient Education (performed throughout session) [x] Review HEP    [] Progressed/Changed HEP based on:   [] proper performance and advancement of Therex/TA   [] reduction in pain level    [] increased functional capacity       [] change in directional preference   20 [x] Therapeutic Exercise   [x]  See Flowsheet   Rationale: increase ROM and increase strength to improve the patients ability to participate in ADL's    15 [x] Therapeutic Activity   [x]  See Flowsheet  Re-assesment  Rationale:  To improve safety, proprioception, coordination, and efficiency with tasks     Modality rationale: decrease inflammation, decrease pain, increase tissue extensibility and increase muscle contraction/control to improve the patients ability to perform ADL's with greater ease     Min Type Additional Details   10 [x]  Cold Pack   [] pre-KAILEY      [x] post-KAILEY  []  Ice massage Location: Left Knee    [x] supine              [] prone  [x] legs elevated    [] legs flat   [] sitting   [x] Skin assessment post-treatment:  [x]intact [x]redness- no adverse reaction       []redness  adverse reaction:     Other Objective/Functional Measures:    Pt displayed stability during SLS only requiring fingertip assistance when balancing bilaterally. Pt has been consistent with HEP. Pt knee ROM reassessed showing improvement in ext/flex on left knee   Post Treatment Pain Level (on 0 to 10) scale:   0  / 10     ASSESSMENT    Assessment/Changes in Function:       See DC         Patient will continue to benefit from skilled PT services to n/a  to attain remaining goals   Progress toward goals / Updated goals:    See DC     PLAN    [x]  Upgrade activities as tolerated  [x]  Update interventions per flow sheet NO Continue plan of care   []  Discharge due to :    []  Other:      Therapist: Jose F Rae \"BJ\" MARY Weiner, Cert. MDT, Cert. DN, Cert. SMT, Dip.  Osteopractic    Date: 5/27/2021 Time: 9:00 AM     Future Appointments   Date Time Provider Jomar Henson   8/12/2021  9:45 AM Jennifer Rocha, [address] [facility] [place]

## 2022-03-21 PROBLEM — R35.1 NOCTURIA: Status: ACTIVE | Noted: 2022-03-21

## 2022-03-21 PROBLEM — N40.1 LOWER URINARY TRACT SYMPTOMS DUE TO BENIGN PROSTATIC HYPERPLASIA: Status: ACTIVE | Noted: 2022-03-21

## 2022-03-21 PROBLEM — M79.2 NEURALGIA: Status: ACTIVE | Noted: 2017-12-20

## 2022-03-21 PROBLEM — M66.0 RUPTURE OF POPLITEAL CYST: Status: ACTIVE | Noted: 2019-07-25

## 2022-03-21 PROBLEM — M25.562 PAIN IN LEFT KNEE: Status: ACTIVE | Noted: 2020-07-14
